# Patient Record
Sex: MALE | Race: WHITE | NOT HISPANIC OR LATINO | Employment: FULL TIME | URBAN - METROPOLITAN AREA
[De-identification: names, ages, dates, MRNs, and addresses within clinical notes are randomized per-mention and may not be internally consistent; named-entity substitution may affect disease eponyms.]

---

## 2019-06-04 RX ORDER — ALBUTEROL SULFATE 90 UG/1
AEROSOL, METERED RESPIRATORY (INHALATION)
Qty: 1 INHALER | Refills: 5 | Status: SHIPPED | OUTPATIENT
Start: 2019-06-04 | End: 2020-06-15 | Stop reason: SDUPTHER

## 2019-06-04 RX ORDER — ALBUTEROL SULFATE 90 UG/1
2 AEROSOL, METERED RESPIRATORY (INHALATION) EVERY 4 HOURS PRN
COMMUNITY
End: 2019-06-14 | Stop reason: SDUPTHER

## 2019-06-04 RX ORDER — ALBUTEROL SULFATE 90 UG/1
2 AEROSOL, METERED RESPIRATORY (INHALATION) EVERY 4 HOURS PRN
COMMUNITY
End: 2019-06-04 | Stop reason: SDUPTHER

## 2019-06-06 PROBLEM — J30.9 ALLERGIC RHINITIS: Status: ACTIVE | Noted: 2019-06-06

## 2019-06-06 PROBLEM — E66.9 OBESITY: Status: ACTIVE | Noted: 2019-06-06

## 2019-06-06 PROBLEM — F98.8 ATTENTION DEFICIT DISORDER OF CHILDHOOD: Status: ACTIVE | Noted: 2019-06-06

## 2019-06-06 PROBLEM — M10.9 GOUT, UNSPECIFIED: Status: ACTIVE | Noted: 2019-06-06

## 2019-06-06 PROBLEM — Z00.00 ANNUAL PHYSICAL EXAM: Status: ACTIVE | Noted: 2019-06-06

## 2019-06-06 PROBLEM — J45.901 EXACERBATION OF ASTHMA: Status: ACTIVE | Noted: 2019-06-06

## 2019-06-06 PROBLEM — F90.0 ATTENTION DEFICIT HYPERACTIVITY DISORDER, PREDOMINANTLY INATTENTIVE TYPE: Status: ACTIVE | Noted: 2019-06-06

## 2019-06-10 ENCOUNTER — OFFICE VISIT (OUTPATIENT)
Dept: INTERNAL MEDICINE | Facility: CLINIC | Age: 64
End: 2019-06-10

## 2019-06-10 VITALS
SYSTOLIC BLOOD PRESSURE: 132 MMHG | BODY MASS INDEX: 34.19 KG/M2 | HEART RATE: 68 BPM | WEIGHT: 275 LBS | DIASTOLIC BLOOD PRESSURE: 88 MMHG | HEIGHT: 75 IN | OXYGEN SATURATION: 97 %

## 2019-06-10 DIAGNOSIS — F98.8 ATTENTION DEFICIT DISORDER OF CHILDHOOD: Primary | ICD-10-CM

## 2019-06-10 DIAGNOSIS — E66.09 CLASS 1 OBESITY DUE TO EXCESS CALORIES WITHOUT SERIOUS COMORBIDITY WITH BODY MASS INDEX (BMI) OF 34.0 TO 34.9 IN ADULT: ICD-10-CM

## 2019-06-10 DIAGNOSIS — J30.1 SEASONAL ALLERGIC RHINITIS DUE TO POLLEN: ICD-10-CM

## 2019-06-10 DIAGNOSIS — J45.21 MILD INTERMITTENT ASTHMA WITH EXACERBATION: ICD-10-CM

## 2019-06-10 PROCEDURE — 99214 OFFICE O/P EST MOD 30 MIN: CPT | Performed by: INTERNAL MEDICINE

## 2019-06-10 RX ORDER — ZINC GLUCONATE 50 MG
1 TABLET ORAL EVERY OTHER DAY
COMMUNITY
Start: 2016-08-04

## 2019-06-10 RX ORDER — MAGNESIUM GLUCONATE 30 MG(550)
1 TABLET ORAL EVERY OTHER DAY
COMMUNITY

## 2019-06-10 RX ORDER — ASCORBIC ACID 500 MG
1000 TABLET ORAL DAILY
COMMUNITY
Start: 2016-03-08

## 2019-06-10 RX ORDER — BUDESONIDE AND FORMOTEROL FUMARATE DIHYDRATE 160; 4.5 UG/1; UG/1
2 AEROSOL RESPIRATORY (INHALATION)
Qty: 1 INHALER | Refills: 12 | COMMUNITY
Start: 2019-06-10 | End: 2019-09-10 | Stop reason: SDUPTHER

## 2019-06-10 RX ORDER — DEXTROAMPHETAMINE SACCHARATE, AMPHETAMINE ASPARTATE, DEXTROAMPHETAMINE SULFATE AND AMPHETAMINE SULFATE 2.5; 2.5; 2.5; 2.5 MG/1; MG/1; MG/1; MG/1
10 TABLET ORAL 2 TIMES DAILY
Qty: 60 TABLET | Refills: 0 | Status: SHIPPED | OUTPATIENT
Start: 2019-06-10 | End: 2019-09-10 | Stop reason: SDUPTHER

## 2019-06-10 NOTE — PROGRESS NOTES
HISTORY OF PRESENT ILLNESS     HPI     Rash  -has been going on for over a month  -rash is itchy  -states that it is is all over his body  -keeps getting new spots  -tried hydrocortisone cream; helps a little bit  -the spots never seem to go away  -no one else has the rash  -switched detergents about 1 month without improvement  -also notes rash on his scrotum     Gout  -allopurinol,  -uric acid 5.9 5/2018     Hyperlipidemia  -atorvastatin     HTN  -enalapril, hydralazine, metoprolol (takes hydralazine BID)  -amlodipine not tolerated d/t ankle edema  -no chest pain/sob  -no problems with medication    DMII  Hemoglobin A1C (%)   Date Value   05/08/2018 8.7 (H)   -metformin  -not checking blood sugars    PAST MEDICAL, FAMILY AND SOCIAL HISTORY     The following histories were personally reviewed and updated.  Current medications    REVIEW OF SYSTEMS     Review of Systems    PHYSICAL EXAM     Physical Exam   Constitutional: He is oriented to person, place, and time. He appears well-developed and well-nourished. No distress.   HENT:   Head: Normocephalic and atraumatic.   Right Ear: External ear normal.   Left Ear: External ear normal.   Eyes: Conjunctivae are normal.   Cardiovascular: Normal rate, regular rhythm and normal heart sounds.   Pulmonary/Chest: Effort normal and breath sounds normal.   Genitourinary: Right testis shows swelling (Right scrotum is significantly larger than the left; testicle is enlarged; no pain with palpation).   Neurological: He is alert and oriented to person, place, and time.   Skin: Skin is warm and dry. He is not diaphoretic.   Psychiatric: He has a normal mood and affect. His behavior is normal.     Scattered scaling 2-3 cm slightly erythematous circular lesions on arms, chest, legs. Scrotum with eczematous changes to inferior aspect   ASSESSMENT/PLAN     59 year old male here today in followup    DMII: Labs per orders    Hypertension: Blood pressure above goal. Pt is currently taking  [facility]     Cody Sanchez  1955   [id number]      Patient Care Team:  Ridge Hunter MD as PCP - General  Ridge Hunter MD as PCP - Family Medicine    Chief Complaint::   Chief Complaint   Patient presents with   • Asthma   • Shortness of Breath     states he can't get a deep breath        HPI  Mr. Sanchez is now 63.  He comes in for follow-up of his allergies, asthma, obesity and attention deficit disorder.  He has been off Adderall, and recently, as work has been busier, has noticed that he had difficulty with focus, attention, multitasking.  Like to get back on Adderall.  His asthma and allergies are worse.  He is using albuterol inhaler and fluticasone nasal once a day.  Despite that he still has wheezing.  He knows he needs to lose weight.  He states he is started walking at lunch.    Chronic Conditions:      Patient Active Problem List   Diagnosis   • Annual physical exam   • Gout, unspecified   • Attention deficit hyperactivity disorder, predominantly inattentive type   • Exacerbation of asthma   • Attention deficit disorder of childhood   • Obesity   • Allergic rhinitis        Past Medical History:   Diagnosis Date   • Deep vein thrombosis (CMS/HCC)        Past Surgical History:   Procedure Laterality Date   • TONSILLECTOMY         Family History   Problem Relation Age of Onset   • Hypertension Mother    • Coronary artery disease Mother    • Hypertension Father    • Coronary artery disease Father    • Cancer Brother         PROSTATE CANCER        Social History     Socioeconomic History   • Marital status:      Spouse name: Not on file   • Number of children: Not on file   • Years of education: Not on file   • Highest education level: Not on file   Tobacco Use   • Smoking status: Never Smoker   • Smokeless tobacco: Never Used   Substance and Sexual Activity   • Alcohol use: No   • Drug use: No       No Known Allergies      Current Outpatient Medications:   •   albuterol sulfate  (90 Base) MCG/ACT inhaler, Inhale 2 puffs by inhalation route every 4-6 hours as needed, Disp: 1 inhaler, Rfl: 5  •  aspirin 81 MG chewable tablet, Chew 81 mg Daily., Disp: , Rfl:   •  B Complex Vitamins (VITAMIN-B COMPLEX PO), Take 1 tablet by mouth Daily., Disp: , Rfl:   •  BIOTIN PO, Take 1 tablet by mouth Every Other Day., Disp: , Rfl:   •  Cholecalciferol (VITAMIN D3) 1000 units capsule, Take 1 capsule by mouth Every Other Day., Disp: , Rfl:   •  Magnesium Gluconate 550 MG tablet, Take 1 tablet by mouth Every Other Day., Disp: , Rfl:   •  MULTIPLE VITAMIN PO, Take 1 tablet by mouth Daily., Disp: , Rfl:   •  vitamin C (ASCORBIC ACID) 500 MG tablet, Take 1 tablet by mouth Daily., Disp: , Rfl:   •  VITAMIN E COMPLEX PO, Take 1 tablet by mouth Every Other Day., Disp: , Rfl:   •  Zinc 50 MG tablet, Take 1 tablet by mouth Every Other Day., Disp: , Rfl:   •  albuterol sulfate  (90 Base) MCG/ACT inhaler, Inhale 2 puffs Every 4 (Four) Hours As Needed., Disp: , Rfl:   •  amphetamine-dextroamphetamine (ADDERALL) 10 MG tablet, Take 1 tablet by mouth 2 (Two) Times a Day., Disp: 60 tablet, Rfl: 0  •  budesonide-formoterol (SYMBICORT) 160-4.5 MCG/ACT inhaler, Inhale 2 puffs 2 (Two) Times a Day., Disp: 1 inhaler, Rfl: 12    Review of Systems   Constitutional: Positive for fatigue. Negative for chills and fever.   HENT: Positive for congestion. Negative for ear pain and sinus pressure.    Respiratory: Positive for cough, chest tightness, shortness of breath and wheezing.    Cardiovascular: Negative for chest pain and palpitations.   Gastrointestinal: Negative for abdominal pain, blood in stool and constipation.   Skin: Negative for color change.   Allergic/Immunologic: Negative for environmental allergies.   Neurological: Negative for dizziness, speech difficulty and headache.   Psychiatric/Behavioral: Negative for decreased concentration. The patient is not nervous/anxious.         Vital  his medications. Have struggled with compliance in terms of followup for this. At this time will continue current medications but will have him schedule nurse visit in 2 weeks for repeat bp check. If still high then would consider increase hydralazine to 100 mg BID.     Rash: Will treat with topical triamcinolone. If no improvement then will refer to derm.    Right testicle enlargement/mass: Pt states that he was told during his colonoscopy in 2010 that his testes was abnormal. He states that it has not changed since then. No pain. Advised him of the need to US ASAP. Pt is going on vacation but states that he will get it done as soon as possible.      "Signs  Vitals:    06/10/19 1553   BP: 132/88   BP Location: Right arm   Patient Position: Sitting   Cuff Size: Adult   Pulse: 68   SpO2: 97%   Weight: 125 kg (275 lb)   Height: 190 cm (74.8\")       Physical Exam   Constitutional: He is oriented to person, place, and time. He appears well-developed and well-nourished.   HENT:   Head: Normocephalic and atraumatic.   Cardiovascular: Normal rate, regular rhythm and normal heart sounds.   No murmur heard.  Pulmonary/Chest: Effort normal. He has wheezes in the right upper field, the right middle field, the left upper field and the left middle field.   Neurological: He is alert and oriented to person, place, and time.   Psychiatric: He has a normal mood and affect.   Vitals reviewed.     Procedures    ACE III MINI             Assessment/Plan:    Cody was seen today for asthma and shortness of breath.    Diagnoses and all orders for this visit:    Attention deficit disorder of childhood    Seasonal allergic rhinitis due to pollen    Mild intermittent asthma with exacerbation    Class 1 obesity due to excess calories without serious comorbidity with body mass index (BMI) of 34.0 to 34.9 in adult    Other orders  -     amphetamine-dextroamphetamine (ADDERALL) 10 MG tablet; Take 1 tablet by mouth 2 (Two) Times a Day.  -     budesonide-formoterol (SYMBICORT) 160-4.5 MCG/ACT inhaler; Inhale 2 puffs 2 (Two) Times a Day.    Plan    Resume Adderall.  He responded well previously    I recommended that he resume over-the-counter treatment for allergies including antihistamines and nasal steroids.    He is given Symbicort inhaler for maintenance therapy and as a controller.  He may continue using albuterol as needed.    BMI is 34.  We discussed strategies to lose weight by reducing calories and improving fitness.      Plan of care reviewed with patient at the conclusion of today's visit. Education was provided regarding diagnosis, management, and any prescribed or recommended OTC " medications.Patient verbalizes understanding of and agreement with management plan.         Ridge Hunter MD

## 2019-09-10 ENCOUNTER — OFFICE VISIT (OUTPATIENT)
Dept: INTERNAL MEDICINE | Facility: CLINIC | Age: 64
End: 2019-09-10

## 2019-09-10 VITALS
SYSTOLIC BLOOD PRESSURE: 116 MMHG | WEIGHT: 263 LBS | TEMPERATURE: 97.6 F | HEIGHT: 75 IN | BODY MASS INDEX: 32.7 KG/M2 | DIASTOLIC BLOOD PRESSURE: 70 MMHG | HEART RATE: 65 BPM

## 2019-09-10 DIAGNOSIS — J45.20 MILD INTERMITTENT ASTHMA WITHOUT COMPLICATION: ICD-10-CM

## 2019-09-10 DIAGNOSIS — J30.1 SEASONAL ALLERGIC RHINITIS DUE TO POLLEN: ICD-10-CM

## 2019-09-10 DIAGNOSIS — E66.09 CLASS 1 OBESITY DUE TO EXCESS CALORIES WITHOUT SERIOUS COMORBIDITY WITH BODY MASS INDEX (BMI) OF 33.0 TO 33.9 IN ADULT: ICD-10-CM

## 2019-09-10 DIAGNOSIS — F90.0 ATTENTION DEFICIT HYPERACTIVITY DISORDER, PREDOMINANTLY INATTENTIVE TYPE: Primary | ICD-10-CM

## 2019-09-10 PROCEDURE — 99214 OFFICE O/P EST MOD 30 MIN: CPT | Performed by: INTERNAL MEDICINE

## 2019-09-10 RX ORDER — BUDESONIDE AND FORMOTEROL FUMARATE DIHYDRATE 160; 4.5 UG/1; UG/1
2 AEROSOL RESPIRATORY (INHALATION)
Qty: 1 INHALER | Refills: 12 | COMMUNITY
Start: 2019-09-10 | End: 2019-10-28 | Stop reason: SDUPTHER

## 2019-09-10 RX ORDER — DEXTROAMPHETAMINE SACCHARATE, AMPHETAMINE ASPARTATE, DEXTROAMPHETAMINE SULFATE AND AMPHETAMINE SULFATE 2.5; 2.5; 2.5; 2.5 MG/1; MG/1; MG/1; MG/1
10 TABLET ORAL 2 TIMES DAILY
Qty: 60 TABLET | Refills: 0 | Status: SHIPPED | OUTPATIENT
Start: 2019-09-10 | End: 2019-10-22 | Stop reason: SDUPTHER

## 2019-09-10 NOTE — PROGRESS NOTES
[facility]     Cody Sanchez  1955   [id number]      Patient Care Team:  Ridge Hunter MD as PCP - General  Ridge Hunter MD as PCP - Family Medicine    Chief Complaint::   Chief Complaint   Patient presents with   • Asthma     fasting; 3 month follow up         HPI  Mr. Sanchez comes in for follow-up of his ADD, asthma, allergic rhinitis and obesity.  He has gradually resumed Adderall, starting with 1/2 tablet once or twice a day when at work and now is back up on 10 mg once or twice a day.  This has helped his attention and focus and he can get his work done more efficiently.  His asthma is a bit flared this morning because he did vacuuming at home before he came to the office, which stirred up some dust.  After our discussion about weight loss last visit, he has implemented intermittent fasting.  He has lost 12 pounds and says this works for him.    Chronic Conditions:      Patient Active Problem List   Diagnosis   • Annual physical exam   • Gout, unspecified   • Attention deficit hyperactivity disorder, predominantly inattentive type   • Exacerbation of asthma   • Attention deficit disorder of childhood   • Obesity   • Allergic rhinitis        Past Medical History:   Diagnosis Date   • Deep vein thrombosis (CMS/HCC)        Past Surgical History:   Procedure Laterality Date   • TONSILLECTOMY         Family History   Problem Relation Age of Onset   • Hypertension Mother    • Coronary artery disease Mother    • Hypertension Father    • Coronary artery disease Father    • Cancer Brother         PROSTATE CANCER        Social History     Socioeconomic History   • Marital status:      Spouse name: Not on file   • Number of children: Not on file   • Years of education: Not on file   • Highest education level: Not on file   Tobacco Use   • Smoking status: Never Smoker   • Smokeless tobacco: Never Used   Substance and Sexual Activity   • Alcohol use: No   • Drug use: No        No Known Allergies      Current Outpatient Medications:   •  albuterol sulfate  (90 Base) MCG/ACT inhaler, Inhale 2 puffs by inhalation route every 4-6 hours as needed, Disp: 1 inhaler, Rfl: 5  •  amphetamine-dextroamphetamine (ADDERALL) 10 MG tablet, Take 1 tablet by mouth 2 (Two) Times a Day., Disp: 60 tablet, Rfl: 0  •  aspirin 81 MG chewable tablet, Chew 81 mg Daily., Disp: , Rfl:   •  B Complex Vitamins (VITAMIN-B COMPLEX PO), Take 1 tablet by mouth Daily., Disp: , Rfl:   •  BIOTIN PO, Take 1 tablet by mouth Every Other Day., Disp: , Rfl:   •  Cholecalciferol (VITAMIN D3) 1000 units capsule, Take 1 capsule by mouth Every Other Day., Disp: , Rfl:   •  Magnesium Gluconate 550 MG tablet, Take 1 tablet by mouth Every Other Day., Disp: , Rfl:   •  MULTIPLE VITAMIN PO, Take 1 tablet by mouth Daily., Disp: , Rfl:   •  vitamin C (ASCORBIC ACID) 500 MG tablet, Take 1 tablet by mouth Daily., Disp: , Rfl:   •  VITAMIN E COMPLEX PO, Take 1 tablet by mouth Every Other Day., Disp: , Rfl:   •  Zinc 50 MG tablet, Take 1 tablet by mouth Every Other Day., Disp: , Rfl:   •  budesonide-formoterol (SYMBICORT) 160-4.5 MCG/ACT inhaler, Inhale 2 puffs 2 (Two) Times a Day., Disp: 1 inhaler, Rfl: 12    Review of Systems   Constitutional: Negative for chills, fatigue and fever.   HENT: Negative for congestion, ear pain and sinus pressure.    Respiratory: Positive for cough, chest tightness, shortness of breath and wheezing.    Cardiovascular: Negative for chest pain and palpitations.   Gastrointestinal: Negative for abdominal pain, blood in stool and constipation.   Skin: Negative for color change.   Allergic/Immunologic: Negative for environmental allergies.   Neurological: Negative for dizziness, speech difficulty and headache.   Psychiatric/Behavioral: Negative for decreased concentration. The patient is not nervous/anxious.         Vital Signs  Vitals:    09/10/19 0919   BP: 116/70   BP Location: Left arm   Patient  "Position: Sitting   Cuff Size: Adult   Pulse: 65   Temp: 97.6 °F (36.4 °C)   TempSrc: Temporal   Weight: 119 kg (263 lb)   Height: 190 cm (74.8\")   PainSc: 0-No pain       Physical Exam   Constitutional: He is oriented to person, place, and time. He appears well-developed and well-nourished. He is obese.  HENT:   Head: Normocephalic and atraumatic.   Cardiovascular: Normal rate, regular rhythm and normal heart sounds.   No murmur heard.  Pulmonary/Chest: Effort normal and breath sounds normal.   Diffuse wheezing on forced expiration only   Neurological: He is alert and oriented to person, place, and time.   Psychiatric: He has a normal mood and affect.   Vitals reviewed.     Procedures    ACE III MINI             Assessment/Plan:    Cody was seen today for asthma.    Diagnoses and all orders for this visit:    Attention deficit hyperactivity disorder, predominantly inattentive type    Class 1 obesity due to excess calories without serious comorbidity with body mass index (BMI) of 33.0 to 33.9 in adult    Mild intermittent asthma without complication    Seasonal allergic rhinitis due to pollen    Other orders  -     amphetamine-dextroamphetamine (ADDERALL) 10 MG tablet; Take 1 tablet by mouth 2 (Two) Times a Day.  -     budesonide-formoterol (SYMBICORT) 160-4.5 MCG/ACT inhaler; Inhale 2 puffs 2 (Two) Times a Day.    Plan    Good response to Adderall, he will continue taking it once or twice a day on workdays.    He will continue Symbicort twice a day for maintenance albuterol as needed for his asthma.    He will continue over-the-counter antihistamines and nasal steroids as needed.    BMI today is 33.  He has had a good response to intermittent fasting with 12 pounds weight loss.  He is working on increasing his physical activity.    I recommended that because he is taking aspirin for primary prevention, that he should discontinue.  Plan of care reviewed with patient at the conclusion of today's visit. Education " was provided regarding diagnosis, management, and any prescribed or recommended OTC medications.Patient verbalizes understanding of and agreement with management plan.         Ridge Hunter MD

## 2019-10-21 ENCOUNTER — TELEPHONE (OUTPATIENT)
Dept: INTERNAL MEDICINE | Facility: CLINIC | Age: 64
End: 2019-10-21

## 2019-10-21 NOTE — TELEPHONE ENCOUNTER
Pt called for refills on his inhalers  I informed him that Albuterol and Symbicort should have refills   He is going to call pharmacy and if not call me back

## 2019-10-22 ENCOUNTER — TELEPHONE (OUTPATIENT)
Dept: INTERNAL MEDICINE | Facility: CLINIC | Age: 64
End: 2019-10-22

## 2019-10-22 RX ORDER — BUDESONIDE AND FORMOTEROL FUMARATE DIHYDRATE 160; 4.5 UG/1; UG/1
2 AEROSOL RESPIRATORY (INHALATION)
Qty: 1 INHALER | Refills: 12 | Status: CANCELLED | COMMUNITY
Start: 2019-10-22

## 2019-10-22 NOTE — TELEPHONE ENCOUNTER
[facility]OSUE SAID THEY NEED NEW PRESCRIPTIONS.    90 DAY SUPPLY IF POSSIBLE.    PLEASE CALL AND ADVISE PATIENT WHEN THE MEDICATIONS ARE CALLED IN.

## 2019-10-23 RX ORDER — DEXTROAMPHETAMINE SACCHARATE, AMPHETAMINE ASPARTATE, DEXTROAMPHETAMINE SULFATE AND AMPHETAMINE SULFATE 2.5; 2.5; 2.5; 2.5 MG/1; MG/1; MG/1; MG/1
10 TABLET ORAL 2 TIMES DAILY
Qty: 60 TABLET | Refills: 0 | Status: SHIPPED | OUTPATIENT
Start: 2019-10-23 | End: 2019-12-09 | Stop reason: SDUPTHER

## 2019-10-28 ENCOUNTER — TELEPHONE (OUTPATIENT)
Dept: INTERNAL MEDICINE | Facility: CLINIC | Age: 64
End: 2019-10-28

## 2019-10-28 RX ORDER — BUDESONIDE AND FORMOTEROL FUMARATE DIHYDRATE 160; 4.5 UG/1; UG/1
2 AEROSOL RESPIRATORY (INHALATION)
Qty: 1 INHALER | Refills: 12 | COMMUNITY
Start: 2019-10-28 | End: 2020-06-09 | Stop reason: SDUPTHER

## 2019-10-28 NOTE — TELEPHONE ENCOUNTER
Patient called to follow up on refill request. Last he checked [facility] has still not received prescription. When I look at the Meds tab I see the refill being ordered and signed, but there is not an E-scribe confirmation receipt from the pharmacy and the class is listed as Sample. Patient would like to confirm that everything was entered correctly on our end for refill. Please advise.

## 2019-10-28 NOTE — TELEPHONE ENCOUNTER
PHARM CALLED IN REGARDS TO THE PATIENTS SYMBICORT 160-4.5 THAT THEY STATED THE PATIENT SAID SHOULD HAVE BEEN CALLED INTO THERE PHARMACY AND WOULD LIKE TO GET A CALL BACK IN REGARDS TO THIS MATTER AS THEY STATED THEY'VE NEVER FILLED THIS SCRIPT FOR THE PATIENT IN THE PASS. THE PHARM CAN BE REACHED [phone]

## 2019-10-28 NOTE — TELEPHONE ENCOUNTER
Notified pharmacy that it was sent in September with 12 refills. They stated they did not get it and I resent.

## 2019-12-09 ENCOUNTER — OFFICE VISIT (OUTPATIENT)
Dept: INTERNAL MEDICINE | Facility: CLINIC | Age: 64
End: 2019-12-09

## 2019-12-09 VITALS
SYSTOLIC BLOOD PRESSURE: 148 MMHG | BODY MASS INDEX: 34.19 KG/M2 | HEART RATE: 88 BPM | WEIGHT: 258 LBS | HEIGHT: 73 IN | DIASTOLIC BLOOD PRESSURE: 92 MMHG

## 2019-12-09 DIAGNOSIS — Z00.00 ANNUAL PHYSICAL EXAM: Primary | ICD-10-CM

## 2019-12-09 DIAGNOSIS — Z12.5 PROSTATE CANCER SCREENING: ICD-10-CM

## 2019-12-09 DIAGNOSIS — R73.9 HYPERGLYCEMIA: ICD-10-CM

## 2019-12-09 DIAGNOSIS — M1A.00X0 IDIOPATHIC CHRONIC GOUT WITHOUT TOPHUS, UNSPECIFIED SITE: ICD-10-CM

## 2019-12-09 DIAGNOSIS — J30.1 SEASONAL ALLERGIC RHINITIS DUE TO POLLEN: ICD-10-CM

## 2019-12-09 DIAGNOSIS — E78.2 MIXED HYPERLIPIDEMIA: ICD-10-CM

## 2019-12-09 DIAGNOSIS — E66.09 CLASS 1 OBESITY DUE TO EXCESS CALORIES WITHOUT SERIOUS COMORBIDITY WITH BODY MASS INDEX (BMI) OF 33.0 TO 33.9 IN ADULT: ICD-10-CM

## 2019-12-09 DIAGNOSIS — Z12.11 COLON CANCER SCREENING: ICD-10-CM

## 2019-12-09 DIAGNOSIS — Z13.0 SCREENING FOR IRON DEFICIENCY ANEMIA: ICD-10-CM

## 2019-12-09 DIAGNOSIS — F90.0 ATTENTION DEFICIT HYPERACTIVITY DISORDER, PREDOMINANTLY INATTENTIVE TYPE: ICD-10-CM

## 2019-12-09 PROCEDURE — 99396 PREV VISIT EST AGE 40-64: CPT | Performed by: INTERNAL MEDICINE

## 2019-12-09 RX ORDER — DEXTROAMPHETAMINE SACCHARATE, AMPHETAMINE ASPARTATE, DEXTROAMPHETAMINE SULFATE AND AMPHETAMINE SULFATE 2.5; 2.5; 2.5; 2.5 MG/1; MG/1; MG/1; MG/1
10 TABLET ORAL 2 TIMES DAILY
Qty: 60 TABLET | Refills: 0 | Status: SHIPPED | OUTPATIENT
Start: 2019-12-09 | End: 2020-03-13 | Stop reason: SDUPTHER

## 2019-12-09 NOTE — PROGRESS NOTES
[facility]     Cody Sanchez  1955   [id number]      Patient Care Team:  Ridge Hunter MD as PCP - General  Ridge Hunter MD as PCP - Family Medicine    Chief Complaint::   Chief Complaint   Patient presents with   • Annual Exam   • ADHD   • Asthma        HPI  Mr. Sanchez is now 64.  He comes in for follow-up of his ADHD, obesity, hyperglycemia, hyperlipidemia, gout, obesity and allergic rhinitis.  Overall he feels well but continues to struggle with weight.  He feels well emotionally and continues to do well as a .  There is no chest pain or dyspnea.  He also takes pictures at sporting events.    Chronic Conditions:      Patient Active Problem List   Diagnosis   • Annual physical exam   • Gout, unspecified   • Attention deficit hyperactivity disorder, predominantly inattentive type   • Exacerbation of asthma   • Obesity   • Allergic rhinitis        Past Medical History:   Diagnosis Date   • Deep vein thrombosis (CMS/HCC)        Past Surgical History:   Procedure Laterality Date   • TONSILLECTOMY         Family History   Problem Relation Age of Onset   • Hypertension Mother    • Coronary artery disease Mother    • Hypertension Father    • Coronary artery disease Father    • Cancer Brother         PROSTATE CANCER        Social History     Socioeconomic History   • Marital status:      Spouse name: Not on file   • Number of children: Not on file   • Years of education: Not on file   • Highest education level: Not on file   Tobacco Use   • Smoking status: Never Smoker   • Smokeless tobacco: Never Used   Substance and Sexual Activity   • Alcohol use: No   • Drug use: No       No Known Allergies      Current Outpatient Medications:   •  albuterol sulfate  (90 Base) MCG/ACT inhaler, Inhale 2 puffs by inhalation route every 4-6 hours as needed, Disp: 1 inhaler, Rfl: 5  •  amphetamine-dextroamphetamine (ADDERALL) 10 MG tablet, Take 1 tablet by mouth 2 (Two)  Times a Day., Disp: 60 tablet, Rfl: 0  •  B Complex Vitamins (VITAMIN-B COMPLEX PO), Take 1 tablet by mouth Daily., Disp: , Rfl:   •  BIOTIN PO, Take 1 tablet by mouth Every Other Day., Disp: , Rfl:   •  budesonide-formoterol (SYMBICORT) 160-4.5 MCG/ACT inhaler, Inhale 2 puffs 2 (Two) Times a Day., Disp: 1 inhaler, Rfl: 12  •  Cholecalciferol (VITAMIN D3) 1000 units capsule, Take 1 capsule by mouth Every Other Day., Disp: , Rfl:   •  Magnesium Gluconate 550 MG tablet, Take 1 tablet by mouth Every Other Day., Disp: , Rfl:   •  MULTIPLE VITAMIN PO, Take 1 tablet by mouth Daily., Disp: , Rfl:   •  VITAMIN A PO, Take 1 tablet by mouth Daily., Disp: , Rfl:   •  vitamin C (ASCORBIC ACID) 500 MG tablet, Take 1 tablet by mouth Daily., Disp: , Rfl:   •  VITAMIN E COMPLEX PO, Take 1 tablet by mouth Every Other Day., Disp: , Rfl:   •  Zinc 50 MG tablet, Take 1 tablet by mouth Every Other Day., Disp: , Rfl:     Immunization History   Administered Date(s) Administered   • Tdap 04/10/2009        Health Maintenance Due   Topic Date Due   • ZOSTER VACCINE (1 of 2) 10/25/2005   • ANNUAL PHYSICAL  01/16/2019   • TDAP/TD VACCINES (2 - Td) 04/10/2019   • HEPATITIS C SCREENING  06/04/2019   • COLONOSCOPY  06/04/2019   • INFLUENZA VACCINE  08/01/2019        Review of Systems   Constitutional: Negative for chills, fatigue and fever.   HENT: Negative for congestion, ear pain and sinus pressure.    Respiratory: Negative for cough, chest tightness, shortness of breath and wheezing.    Cardiovascular: Negative for chest pain and palpitations.   Gastrointestinal: Negative for abdominal pain, blood in stool and constipation.   Skin: Negative for color change.   Allergic/Immunologic: Negative for environmental allergies.   Neurological: Negative for dizziness, speech difficulty and headache.   Psychiatric/Behavioral: Negative for decreased concentration. The patient is not nervous/anxious.         Vital Signs  Vitals:    12/09/19 1628   BP:  "148/92   BP Location: Left arm   Patient Position: Sitting   Cuff Size: Adult   Pulse: 88   Weight: 117 kg (258 lb)   Height: 184.2 cm (72.5\")   PainSc: 0-No pain       Physical Exam   Constitutional: He is oriented to person, place, and time. He appears well-developed and well-nourished.   HENT:   Head: Normocephalic and atraumatic.   Right Ear: External ear normal.   Left Ear: External ear normal.   Nose: Nose normal.   Mouth/Throat: Oropharynx is clear and moist. No oropharyngeal exudate.   Eyes: Pupils are equal, round, and reactive to light. Conjunctivae and EOM are normal.   Neck: Normal range of motion. Neck supple. No JVD present. No thyromegaly present.   Cardiovascular: Normal rate, regular rhythm, normal heart sounds and intact distal pulses. Exam reveals no gallop and no friction rub.   No murmur heard.  Pulmonary/Chest: Effort normal and breath sounds normal. No respiratory distress. He has no wheezes. He has no rales. He exhibits no tenderness.   Abdominal: Soft. Bowel sounds are normal. He exhibits no distension and no mass. There is no tenderness. There is no rebound and no guarding. No hernia.   Musculoskeletal: Normal range of motion. He exhibits no tenderness.   Lymphadenopathy:     He has no cervical adenopathy.   Neurological: He is alert and oriented to person, place, and time. He displays normal reflexes. No cranial nerve deficit or sensory deficit. He exhibits normal muscle tone. Coordination normal.   Skin: Skin is warm and dry. No rash noted. No erythema.   Psychiatric: He has a normal mood and affect. His behavior is normal. Judgment and thought content normal.   Nursing note and vitals reviewed.     Procedures     Fall Risk Screen:  STEADI Fall Risk Assessment has not been completed.    Health Habits and Functional and Cognitive Screening:  No flowsheet data found.    Depression Sreening  PHQ-9 Total Score: 0     ACE III MINI             Assessment/Plan:    Cody was seen today for " annual exam, adhd and asthma.    Diagnoses and all orders for this visit:    Annual physical exam    Hyperglycemia  -     Hemoglobin A1c; Future  -     Hemoglobin A1c    Prostate cancer screening  -     PSA Screen; Future  -     PSA Screen    Mixed hyperlipidemia  -     Comprehensive Metabolic Panel; Future  -     Lipid Panel; Future  -     Lipid Panel  -     Comprehensive Metabolic Panel    Idiopathic chronic gout without tophus, unspecified site    Screening for iron deficiency anemia  -     CBC & Differential; Future  -     CBC & Differential    Attention deficit hyperactivity disorder, predominantly inattentive type  -     amphetamine-dextroamphetamine (ADDERALL) 10 MG tablet; Take 1 tablet by mouth 2 (Two) Times a Day.    Colon cancer screening  -     Ambulatory Referral to Colorectal Surgery    Class 1 obesity due to excess calories without serious comorbidity with body mass index (BMI) of 33.0 to 33.9 in adult    Seasonal allergic rhinitis due to pollen    Plan    Overall he remains in good health but needs to lose weight.  This is discussed below.  He is due for colorectal cancer screening and is referred to colonoscopy.    A1c is pending, treatment remains avoidance of weight gain and low-carb diet.    Lipid panel is pending, the treatment remains healthy diet and avoidance of weight gain.    Gout is currently asymptomatic, he will continue treatment as needed with NSAIDs.    He continues to have a good response to Adderall with good focus and attention.    BMI is 34.5, once again we discussed the importance of low-carb diet and regular exercise to lose weight.    Allergies and asthma are well controlled on Symbicort daily, albuterol as needed, and allergy treatment.          Labs  Results for orders placed or performed in visit on 12/09/19   Hemoglobin A1c   Result Value Ref Range    Hemoglobin A1C 6.00 (H) 4.80 - 5.60 %   PSA Screen   Result Value Ref Range    PSA 0.273 0.000 - 4.000 ng/mL   Lipid Panel    Result Value Ref Range    Total Cholesterol 196 0 - 200 mg/dL    Triglycerides 107 0 - 150 mg/dL    HDL Cholesterol 36 (L) 40 - 60 mg/dL    VLDL Cholesterol 21.4 mg/dL    LDL Cholesterol  139 (H) 0 - 100 mg/dL   Comprehensive Metabolic Panel   Result Value Ref Range    Glucose 105 (H) 65 - 99 mg/dL    BUN 13 8 - 23 mg/dL    Creatinine 0.86 0.76 - 1.27 mg/dL    eGFR Non African Am 90 >60 mL/min/1.73    eGFR African Am 109 >60 mL/min/1.73    BUN/Creatinine Ratio 15.1 7.0 - 25.0    Sodium 142 136 - 145 mmol/L    Potassium 4.9 3.5 - 5.2 mmol/L    Chloride 103 98 - 107 mmol/L    Total CO2 26.8 22.0 - 29.0 mmol/L    Calcium 9.3 8.6 - 10.5 mg/dL    Total Protein 6.9 6.0 - 8.5 g/dL    Albumin 4.60 3.50 - 5.20 g/dL    Globulin 2.3 gm/dL    A/G Ratio 2.0 g/dL    Total Bilirubin 0.4 0.2 - 1.2 mg/dL    Alkaline Phosphatase 81 39 - 117 U/L    AST (SGOT) 22 1 - 40 U/L    ALT (SGPT) 17 1 - 41 U/L   CBC & Differential   Result Value Ref Range    WBC 6.81 3.40 - 10.80 10*3/mm3    RBC 4.73 4.14 - 5.80 10*6/mm3    Hemoglobin 13.9 13.0 - 17.7 g/dL    Hematocrit 41.2 37.5 - 51.0 %    MCV 87.1 79.0 - 97.0 fL    MCH 29.4 26.6 - 33.0 pg    MCHC 33.7 31.5 - 35.7 g/dL    RDW 12.4 12.3 - 15.4 %    Platelets 311 140 - 450 10*3/mm3    Neutrophil Rel % 59.0 42.7 - 76.0 %    Lymphocyte Rel % 22.9 19.6 - 45.3 %    Monocyte Rel % 9.7 5.0 - 12.0 %    Eosinophil Rel % 6.8 (H) 0.3 - 6.2 %    Basophil Rel % 1.3 0.0 - 1.5 %    Neutrophils Absolute 4.02 1.70 - 7.00 10*3/mm3    Lymphocytes Absolute 1.56 0.70 - 3.10 10*3/mm3    Monocytes Absolute 0.66 0.10 - 0.90 10*3/mm3    Eosinophils Absolute 0.46 (H) 0.00 - 0.40 10*3/mm3    Basophils Absolute 0.09 0.00 - 0.20 10*3/mm3    Immature Granulocyte Rel % 0.3 0.0 - 0.5 %    Immature Grans Absolute 0.02 0.00 - 0.05 10*3/mm3    nRBC 0.0 0.0 - 0.2 /100 WBC        Counseling was given to patient for the following topics: appropriate exercise 150 minutes/week, disease prevention and healthy eating habits.    Plan  of care reviewed with patient at the conclusion of today's visit. Education was provided regarding diagnosis, management, and any prescribed or recommended OTC medications.Patient verbalizes understanding of and agreement with management plan.         Ridge Hunter MD

## 2019-12-11 ENCOUNTER — LAB REQUISITION (OUTPATIENT)
Dept: LAB | Facility: HOSPITAL | Age: 64
End: 2019-12-11

## 2019-12-11 DIAGNOSIS — Z00.00 ROUTINE GENERAL MEDICAL EXAMINATION AT A HEALTH CARE FACILITY: ICD-10-CM

## 2019-12-11 PROCEDURE — 36415 COLL VENOUS BLD VENIPUNCTURE: CPT

## 2019-12-12 LAB
ALBUMIN SERPL-MCNC: 4.6 G/DL (ref 3.5–5.2)
ALBUMIN/GLOB SERPL: 2 G/DL
ALP SERPL-CCNC: 81 U/L (ref 39–117)
ALT SERPL-CCNC: 17 U/L (ref 1–41)
AST SERPL-CCNC: 22 U/L (ref 1–40)
BASOPHILS # BLD AUTO: 0.09 10*3/MM3 (ref 0–0.2)
BASOPHILS NFR BLD AUTO: 1.3 % (ref 0–1.5)
BILIRUB SERPL-MCNC: 0.4 MG/DL (ref 0.2–1.2)
BUN SERPL-MCNC: 13 MG/DL (ref 8–23)
BUN/CREAT SERPL: 15.1 (ref 7–25)
CALCIUM SERPL-MCNC: 9.3 MG/DL (ref 8.6–10.5)
CHLORIDE SERPL-SCNC: 103 MMOL/L (ref 98–107)
CHOLEST SERPL-MCNC: 196 MG/DL (ref 0–200)
CO2 SERPL-SCNC: 26.8 MMOL/L (ref 22–29)
CREAT SERPL-MCNC: 0.86 MG/DL (ref 0.76–1.27)
EOSINOPHIL # BLD AUTO: 0.46 10*3/MM3 (ref 0–0.4)
EOSINOPHIL NFR BLD AUTO: 6.8 % (ref 0.3–6.2)
ERYTHROCYTE [DISTWIDTH] IN BLOOD BY AUTOMATED COUNT: 12.4 % (ref 12.3–15.4)
GLOBULIN SER CALC-MCNC: 2.3 GM/DL
GLUCOSE SERPL-MCNC: 105 MG/DL (ref 65–99)
HBA1C MFR BLD: 6 % (ref 4.8–5.6)
HCT VFR BLD AUTO: 41.2 % (ref 37.5–51)
HDLC SERPL-MCNC: 36 MG/DL (ref 40–60)
HGB BLD-MCNC: 13.9 G/DL (ref 13–17.7)
IMM GRANULOCYTES # BLD AUTO: 0.02 10*3/MM3 (ref 0–0.05)
IMM GRANULOCYTES NFR BLD AUTO: 0.3 % (ref 0–0.5)
LDLC SERPL CALC-MCNC: 139 MG/DL (ref 0–100)
LYMPHOCYTES # BLD AUTO: 1.56 10*3/MM3 (ref 0.7–3.1)
LYMPHOCYTES NFR BLD AUTO: 22.9 % (ref 19.6–45.3)
MCH RBC QN AUTO: 29.4 PG (ref 26.6–33)
MCHC RBC AUTO-ENTMCNC: 33.7 G/DL (ref 31.5–35.7)
MCV RBC AUTO: 87.1 FL (ref 79–97)
MONOCYTES # BLD AUTO: 0.66 10*3/MM3 (ref 0.1–0.9)
MONOCYTES NFR BLD AUTO: 9.7 % (ref 5–12)
NEUTROPHILS # BLD AUTO: 4.02 10*3/MM3 (ref 1.7–7)
NEUTROPHILS NFR BLD AUTO: 59 % (ref 42.7–76)
NRBC BLD AUTO-RTO: 0 /100 WBC (ref 0–0.2)
PLATELET # BLD AUTO: 311 10*3/MM3 (ref 140–450)
POTASSIUM SERPL-SCNC: 4.9 MMOL/L (ref 3.5–5.2)
PROT SERPL-MCNC: 6.9 G/DL (ref 6–8.5)
PSA SERPL-MCNC: 0.27 NG/ML (ref 0–4)
RBC # BLD AUTO: 4.73 10*6/MM3 (ref 4.14–5.8)
SODIUM SERPL-SCNC: 142 MMOL/L (ref 136–145)
TRIGL SERPL-MCNC: 107 MG/DL (ref 0–150)
VLDLC SERPL CALC-MCNC: 21.4 MG/DL
WBC # BLD AUTO: 6.81 10*3/MM3 (ref 3.4–10.8)

## 2020-03-13 ENCOUNTER — OFFICE VISIT (OUTPATIENT)
Dept: INTERNAL MEDICINE | Facility: CLINIC | Age: 65
End: 2020-03-13

## 2020-03-13 ENCOUNTER — LAB REQUISITION (OUTPATIENT)
Dept: LAB | Facility: HOSPITAL | Age: 65
End: 2020-03-13

## 2020-03-13 ENCOUNTER — TELEPHONE (OUTPATIENT)
Dept: INTERNAL MEDICINE | Facility: CLINIC | Age: 65
End: 2020-03-13

## 2020-03-13 VITALS
WEIGHT: 250 LBS | BODY MASS INDEX: 33.13 KG/M2 | DIASTOLIC BLOOD PRESSURE: 90 MMHG | HEART RATE: 88 BPM | SYSTOLIC BLOOD PRESSURE: 132 MMHG | HEIGHT: 73 IN

## 2020-03-13 DIAGNOSIS — E78.2 MIXED HYPERLIPIDEMIA: Primary | ICD-10-CM

## 2020-03-13 DIAGNOSIS — Z00.00 ROUTINE GENERAL MEDICAL EXAMINATION AT A HEALTH CARE FACILITY: ICD-10-CM

## 2020-03-13 DIAGNOSIS — R73.03 PREDIABETES: ICD-10-CM

## 2020-03-13 DIAGNOSIS — H53.9 ABNORMAL VISION: ICD-10-CM

## 2020-03-13 DIAGNOSIS — J30.1 SEASONAL ALLERGIC RHINITIS DUE TO POLLEN: ICD-10-CM

## 2020-03-13 DIAGNOSIS — F90.0 ATTENTION DEFICIT HYPERACTIVITY DISORDER, PREDOMINANTLY INATTENTIVE TYPE: ICD-10-CM

## 2020-03-13 DIAGNOSIS — E66.09 CLASS 1 OBESITY DUE TO EXCESS CALORIES WITHOUT SERIOUS COMORBIDITY WITH BODY MASS INDEX (BMI) OF 33.0 TO 33.9 IN ADULT: ICD-10-CM

## 2020-03-13 PROCEDURE — 36415 COLL VENOUS BLD VENIPUNCTURE: CPT | Performed by: INTERNAL MEDICINE

## 2020-03-13 PROCEDURE — 99214 OFFICE O/P EST MOD 30 MIN: CPT | Performed by: INTERNAL MEDICINE

## 2020-03-13 RX ORDER — DEXTROAMPHETAMINE SACCHARATE, AMPHETAMINE ASPARTATE, DEXTROAMPHETAMINE SULFATE AND AMPHETAMINE SULFATE 2.5; 2.5; 2.5; 2.5 MG/1; MG/1; MG/1; MG/1
10 TABLET ORAL 2 TIMES DAILY
Qty: 60 TABLET | Refills: 0 | Status: SHIPPED | OUTPATIENT
Start: 2020-03-13 | End: 2020-06-09 | Stop reason: SDUPTHER

## 2020-03-13 NOTE — PROGRESS NOTES
[facility]     Cody Sanchez  1955   [id number]      Patient Care Team:  Ridge Hunter MD as PCP - General  Ridge Hunter MD as PCP - Family Medicine    Chief Complaint::   Chief Complaint   Patient presents with   • ADHD   • Asthma        HPI  Mr. Sanchez comes in for follow-up of his hyperlipidemia, prediabetes, ADHD and obesity.  He continues to follow on intermittent fasting diet.  He has lost 25 pounds since last June.  He feels well.  3 days ago he experienced left lat shimmering in his visual fields.  He was at work looking at the computer.  It lasted about 20 seconds and has not recurred.  He has had no other symptoms.  There is no chest pain, dyspnea or other neurologic deficits.  His asthma and allergies appear to be reasonably well controlled.  He experiences tinnitus, he has not noticed hearing loss.    Chronic Conditions:      Patient Active Problem List   Diagnosis   • Annual physical exam   • Gout, unspecified   • Attention deficit hyperactivity disorder, predominantly inattentive type   • Exacerbation of asthma   • Obesity   • Allergic rhinitis   • Mixed hyperlipidemia   • Prediabetes        Past Medical History:   Diagnosis Date   • Deep vein thrombosis (CMS/HCC)        Past Surgical History:   Procedure Laterality Date   • TONSILLECTOMY         Family History   Problem Relation Age of Onset   • Hypertension Mother    • Coronary artery disease Mother    • Hypertension Father    • Coronary artery disease Father    • Cancer Brother         PROSTATE CANCER        Social History     Socioeconomic History   • Marital status:      Spouse name: Not on file   • Number of children: Not on file   • Years of education: Not on file   • Highest education level: Not on file   Tobacco Use   • Smoking status: Never Smoker   • Smokeless tobacco: Never Used   Substance and Sexual Activity   • Alcohol use: No   • Drug use: No       No Known Allergies      Current  Outpatient Medications:   •  albuterol sulfate  (90 Base) MCG/ACT inhaler, Inhale 2 puffs by inhalation route every 4-6 hours as needed, Disp: 1 inhaler, Rfl: 5  •  amphetamine-dextroamphetamine (ADDERALL) 10 MG tablet, Take 1 tablet by mouth 2 (Two) Times a Day., Disp: 60 tablet, Rfl: 0  •  B Complex Vitamins (VITAMIN-B COMPLEX PO), Take 1 tablet by mouth Daily., Disp: , Rfl:   •  BIOTIN PO, Take 1 tablet by mouth Every Other Day., Disp: , Rfl:   •  budesonide-formoterol (SYMBICORT) 160-4.5 MCG/ACT inhaler, Inhale 2 puffs 2 (Two) Times a Day., Disp: 1 inhaler, Rfl: 12  •  Cholecalciferol (VITAMIN D3) 1000 units capsule, Take 1 capsule by mouth Every Other Day., Disp: , Rfl:   •  Magnesium Gluconate 550 MG tablet, Take 1 tablet by mouth Every Other Day., Disp: , Rfl:   •  MULTIPLE VITAMIN PO, Take 1 tablet by mouth Daily., Disp: , Rfl:   •  VITAMIN A PO, Take 1 tablet by mouth Daily., Disp: , Rfl:   •  vitamin C (ASCORBIC ACID) 500 MG tablet, Take 1 tablet by mouth Daily., Disp: , Rfl:   •  VITAMIN E COMPLEX PO, Take 1 tablet by mouth Every Other Day., Disp: , Rfl:   •  Zinc 50 MG tablet, Take 1 tablet by mouth Every Other Day., Disp: , Rfl:     Review of Systems   Constitutional: Negative for chills, fatigue and fever.   HENT: Negative for congestion, ear pain and sinus pressure.    Respiratory: Negative for cough, chest tightness, shortness of breath and wheezing.    Cardiovascular: Negative for chest pain and palpitations.   Gastrointestinal: Negative for abdominal pain, blood in stool and constipation.   Skin: Negative for color change.   Allergic/Immunologic: Negative for environmental allergies.   Neurological: Negative for dizziness, speech difficulty and headache.   Psychiatric/Behavioral: Negative for decreased concentration. The patient is not nervous/anxious.         Vital Signs  Vitals:    03/13/20 0818   BP: 132/90   BP Location: Left arm   Patient Position: Sitting   Cuff Size: Adult   Pulse: 88  "  Weight: 113 kg (250 lb)   Height: 184.2 cm (72.52\")   PainSc: 0-No pain       Physical Exam   Constitutional: He is oriented to person, place, and time. He appears well-developed and well-nourished.   HENT:   Head: Normocephalic and atraumatic.   Right Ear: Tympanic membrane and ear canal normal.   Left Ear: Tympanic membrane and ear canal normal.   Cardiovascular: Normal rate, regular rhythm and normal heart sounds.   No murmur heard.  Pulmonary/Chest: Effort normal and breath sounds normal.   Neurological: He is alert and oriented to person, place, and time.   Psychiatric: He has a normal mood and affect.   Vitals reviewed.     Procedures    ACE III MINI             Assessment/Plan:    Cody was seen today for adhd and asthma.    Diagnoses and all orders for this visit:    Mixed hyperlipidemia  -     Comprehensive Metabolic Panel; Future  -     Lipid Panel; Future    Prediabetes  -     Hemoglobin A1c; Future    Attention deficit hyperactivity disorder, predominantly inattentive type  -     amphetamine-dextroamphetamine (ADDERALL) 10 MG tablet; Take 1 tablet by mouth 2 (Two) Times a Day.    Class 1 obesity due to excess calories without serious comorbidity with body mass index (BMI) of 33.0 to 33.9 in adult    Seasonal allergic rhinitis due to pollen    Abnormal vision    Plan    Lipid panel is pending, treatment remains healthy diet and weight loss.    A1c is pending.  Last visit it was 6.0.  He is taken this seriously and is aggressively lost weight.    ADHD remains well controlled.  He usually takes Adderall once a day's, but sometimes twice a day.  He does not take it on the weekends.    BMI is 33.4, which represents significant weight loss over the past year.  He will continue intermittent fasting which has worked extremely well for him.    Allergies and asthma are stable.  Continue Symbicort and albuterol as needed.  Tinnitus is most likely due to sensorineural hearing loss.  He is not interested in " pursuing this at present.    Visual symptoms that he describes are nonspecific, do not sound like a retinal tear, but I suggested that he have his eyes examined in the next few weeks.      Plan of care reviewed with patient at the conclusion of today's visit. Education was provided regarding diagnosis, management, and any prescribed or recommended OTC medications.Patient verbalizes understanding of and agreement with management plan.         Ridge Hunter MD

## 2020-03-13 NOTE — TELEPHONE ENCOUNTER
Pt was asking about colonoscopy referral. Spoke to Ashleigh in referrals and it was sent to Dr. House in December. She called Dr. House's office and they said the pt needed to talk to their billing dept at [phone]. Dr. Hunter notified and called to pt

## 2020-03-14 LAB
ALBUMIN SERPL-MCNC: 4.6 G/DL (ref 3.5–5.2)
ALBUMIN/GLOB SERPL: 1.9 G/DL
ALP SERPL-CCNC: 82 U/L (ref 39–117)
ALT SERPL-CCNC: 24 U/L (ref 1–41)
AST SERPL-CCNC: 25 U/L (ref 1–40)
BILIRUB SERPL-MCNC: 0.6 MG/DL (ref 0.2–1.2)
BUN SERPL-MCNC: 12 MG/DL (ref 8–23)
BUN/CREAT SERPL: 12.2 (ref 7–25)
CALCIUM SERPL-MCNC: 9.7 MG/DL (ref 8.6–10.5)
CHLORIDE SERPL-SCNC: 101 MMOL/L (ref 98–107)
CHOLEST SERPL-MCNC: 193 MG/DL (ref 0–200)
CO2 SERPL-SCNC: 26 MMOL/L (ref 22–29)
CREAT SERPL-MCNC: 0.98 MG/DL (ref 0.76–1.27)
GLOBULIN SER CALC-MCNC: 2.4 GM/DL
GLUCOSE SERPL-MCNC: 116 MG/DL (ref 65–99)
HBA1C MFR BLD: 7.4 % (ref 4.8–5.6)
HDLC SERPL-MCNC: 37 MG/DL (ref 40–60)
LDLC SERPL CALC-MCNC: 118 MG/DL (ref 0–100)
POTASSIUM SERPL-SCNC: 5.3 MMOL/L (ref 3.5–5.2)
PROT SERPL-MCNC: 7 G/DL (ref 6–8.5)
SODIUM SERPL-SCNC: 137 MMOL/L (ref 136–145)
TRIGL SERPL-MCNC: 189 MG/DL (ref 0–150)
VLDLC SERPL CALC-MCNC: 37.8 MG/DL

## 2020-03-16 ENCOUNTER — TELEPHONE (OUTPATIENT)
Dept: INTERNAL MEDICINE | Facility: CLINIC | Age: 65
End: 2020-03-16

## 2020-03-16 NOTE — TELEPHONE ENCOUNTER
----- Message from Ridge Hunter MD sent at 3/15/2020  4:21 PM EDT -----  Let him know that despite his weight loss, his A1c, or 90 day average glucose is now 7.4, which is well above the diabetic threshold of 6.5.  This means he has diabetes.  He should keep working on weight loss as he is doing.  I want him to start metformin for diabetes.

## 2020-06-09 ENCOUNTER — OFFICE VISIT (OUTPATIENT)
Dept: INTERNAL MEDICINE | Facility: CLINIC | Age: 65
End: 2020-06-09

## 2020-06-09 VITALS
TEMPERATURE: 97.5 F | BODY MASS INDEX: 34.46 KG/M2 | HEART RATE: 80 BPM | DIASTOLIC BLOOD PRESSURE: 82 MMHG | HEIGHT: 73 IN | SYSTOLIC BLOOD PRESSURE: 130 MMHG | WEIGHT: 260 LBS

## 2020-06-09 DIAGNOSIS — F90.0 ATTENTION DEFICIT HYPERACTIVITY DISORDER, PREDOMINANTLY INATTENTIVE TYPE: Primary | ICD-10-CM

## 2020-06-09 DIAGNOSIS — E66.09 CLASS 1 OBESITY DUE TO EXCESS CALORIES WITHOUT SERIOUS COMORBIDITY WITH BODY MASS INDEX (BMI) OF 33.0 TO 33.9 IN ADULT: ICD-10-CM

## 2020-06-09 DIAGNOSIS — R73.9 HYPERGLYCEMIA: ICD-10-CM

## 2020-06-09 DIAGNOSIS — R73.03 PREDIABETES: ICD-10-CM

## 2020-06-09 DIAGNOSIS — J30.1 SEASONAL ALLERGIC RHINITIS DUE TO POLLEN: ICD-10-CM

## 2020-06-09 LAB — HBA1C MFR BLD: 5.8 %

## 2020-06-09 PROCEDURE — 83036 HEMOGLOBIN GLYCOSYLATED A1C: CPT | Performed by: INTERNAL MEDICINE

## 2020-06-09 PROCEDURE — 99214 OFFICE O/P EST MOD 30 MIN: CPT | Performed by: INTERNAL MEDICINE

## 2020-06-09 RX ORDER — DEXTROAMPHETAMINE SACCHARATE, AMPHETAMINE ASPARTATE, DEXTROAMPHETAMINE SULFATE AND AMPHETAMINE SULFATE 2.5; 2.5; 2.5; 2.5 MG/1; MG/1; MG/1; MG/1
10 TABLET ORAL 2 TIMES DAILY
Qty: 60 TABLET | Refills: 0 | Status: SHIPPED | OUTPATIENT
Start: 2020-06-09 | End: 2020-09-16 | Stop reason: SDUPTHER

## 2020-06-09 RX ORDER — BUDESONIDE AND FORMOTEROL FUMARATE DIHYDRATE 160; 4.5 UG/1; UG/1
2 AEROSOL RESPIRATORY (INHALATION)
Qty: 1 INHALER | Refills: 12 | COMMUNITY
Start: 2020-06-09 | End: 2020-06-09

## 2020-06-09 RX ORDER — BUDESONIDE AND FORMOTEROL FUMARATE DIHYDRATE 160; 4.5 UG/1; UG/1
2 AEROSOL RESPIRATORY (INHALATION)
Qty: 1 INHALER | Refills: 12 | Status: SHIPPED | OUTPATIENT
Start: 2020-06-09 | End: 2020-06-11

## 2020-06-09 NOTE — PROGRESS NOTES
[facility]     Cody Sanchez  1955   [id number]      Patient Care Team:  Ridge Hunter MD as PCP - General  Ridge Hunter MD as PCP - Family Medicine    Chief Complaint::   Chief Complaint   Patient presents with   • Hyperlipidemia   • ADD        HPI  Mr. Sanchez comes in for follow-up of his ADHD, hyperglycemia, allergies and asthma and obesity.  Last visit his A1c was 7.4.  He was instructed to start metformin but did not  the prescription as he did not understand.  He feels well today.  He is back to work and very busy.  He has gained a little weight.  He denies fever, cough, shortness of breath or chest pain.  He has not changed his diet in any way.  His ADD is well compensated on Adderall.  His allergies are slightly flared due to his daughter's cat, but he is using his inhalers and taking his antihistamine.    Chronic Conditions:      Patient Active Problem List   Diagnosis   • Annual physical exam   • Gout, unspecified   • Attention deficit hyperactivity disorder, predominantly inattentive type   • Exacerbation of asthma   • Obesity   • Allergic rhinitis   • Mixed hyperlipidemia   • Prediabetes        Past Medical History:   Diagnosis Date   • Deep vein thrombosis (CMS/HCC)        Past Surgical History:   Procedure Laterality Date   • TONSILLECTOMY         Family History   Problem Relation Age of Onset   • Hypertension Mother    • Coronary artery disease Mother    • Hypertension Father    • Coronary artery disease Father    • Cancer Brother         PROSTATE CANCER        Social History     Socioeconomic History   • Marital status:      Spouse name: Not on file   • Number of children: Not on file   • Years of education: Not on file   • Highest education level: Not on file   Tobacco Use   • Smoking status: Never Smoker   • Smokeless tobacco: Never Used   Substance and Sexual Activity   • Alcohol use: No   • Drug use: No       No Known  Allergies      Current Outpatient Medications:   •  albuterol sulfate  (90 Base) MCG/ACT inhaler, Inhale 2 puffs by inhalation route every 4-6 hours as needed, Disp: 1 inhaler, Rfl: 5  •  amphetamine-dextroamphetamine (Adderall) 10 MG tablet, Take 1 tablet by mouth 2 (Two) Times a Day., Disp: 60 tablet, Rfl: 0  •  B Complex Vitamins (VITAMIN-B COMPLEX PO), Take 1 tablet by mouth Daily., Disp: , Rfl:   •  BIOTIN PO, Take 1 tablet by mouth Every Other Day., Disp: , Rfl:   •  budesonide-formoterol (Symbicort) 160-4.5 MCG/ACT inhaler, Inhale 2 puffs 2 (Two) Times a Day., Disp: 1 inhaler, Rfl: 12  •  Cholecalciferol (VITAMIN D3) 1000 units capsule, Take 1 capsule by mouth Every Other Day., Disp: , Rfl:   •  Magnesium Gluconate 550 MG tablet, Take 1 tablet by mouth Every Other Day., Disp: , Rfl:   •  MULTIPLE VITAMIN PO, Take 1 tablet by mouth Daily., Disp: , Rfl:   •  VITAMIN A PO, Take 1 tablet by mouth Daily., Disp: , Rfl:   •  vitamin C (ASCORBIC ACID) 500 MG tablet, Take 1 tablet by mouth Daily., Disp: , Rfl:   •  VITAMIN E COMPLEX PO, Take 1 tablet by mouth Every Other Day., Disp: , Rfl:   •  Zinc 50 MG tablet, Take 1 tablet by mouth Every Other Day., Disp: , Rfl:     Review of Systems   Constitutional: Negative for chills, fatigue and fever.   HENT: Negative for congestion, ear pain and sinus pressure.    Respiratory: Negative for cough, chest tightness, shortness of breath and wheezing.    Cardiovascular: Negative for chest pain and palpitations.   Gastrointestinal: Negative for abdominal pain, blood in stool and constipation.   Skin: Negative for color change.   Allergic/Immunologic: Positive for environmental allergies.   Neurological: Negative for dizziness, speech difficulty and headache.   Psychiatric/Behavioral: Negative for decreased concentration. The patient is not nervous/anxious.         Vital Signs  Vitals:    06/09/20 0913   BP: 130/82   BP Location: Left arm   Patient Position: Sitting   Cuff  "Size: Adult   Pulse: 80   Temp: 97.5 °F (36.4 °C)   TempSrc: Temporal   Weight: 118 kg (260 lb)   Height: 184.2 cm (72.52\")   PainSc: 0-No pain       Physical Exam   Constitutional: He is oriented to person, place, and time. He appears well-developed and well-nourished.   HENT:   Head: Normocephalic and atraumatic.   Cardiovascular: Normal rate, regular rhythm and normal heart sounds.   No murmur heard.  Pulmonary/Chest: Effort normal and breath sounds normal.   Neurological: He is alert and oriented to person, place, and time.   Psychiatric: He has a normal mood and affect.   Vitals reviewed.     Procedures    ACE III MINI             Assessment/Plan:    Cody was seen today for hyperlipidemia and add.    Diagnoses and all orders for this visit:    Attention deficit hyperactivity disorder, predominantly inattentive type  -     amphetamine-dextroamphetamine (Adderall) 10 MG tablet; Take 1 tablet by mouth 2 (Two) Times a Day.    Hyperglycemia  -     POC Glycosylated Hemoglobin (Hb A1C)  -     Ambulatory Referral to Nutrition Services    Seasonal allergic rhinitis due to pollen    Prediabetes    Class 1 obesity due to excess calories without serious comorbidity with body mass index (BMI) of 33.0 to 33.9 in adult    Other orders  -     Discontinue: budesonide-formoterol (Symbicort) 160-4.5 MCG/ACT inhaler; Inhale 2 puffs 2 (Two) Times a Day.  -     budesonide-formoterol (Symbicort) 160-4.5 MCG/ACT inhaler; Inhale 2 puffs 2 (Two) Times a Day.    Plan    Attention deficit is well controlled on Adderall with good attention and focus without evidence of tolerance or misuse.    A1c in March was well within the diabetic range, so it is curious that it is down to 5.8 today without much effort and without any weight loss.  We will hold off on metformin for now.  Discussed the importance of reduced carb diet and weight loss.  Will refer to nutritional services for dietary counseling.    Allergies and asthma are reasonably " well controlled on current therapy, continue Symbicort and Ventolin as needed.    BMI is 34.8 which represents a slight weight gain from last visit.  See dietary instructions above.      Plan of care reviewed with patient at the conclusion of today's visit. Education was provided regarding diagnosis, management, and any prescribed or recommended OTC medications.Patient verbalizes understanding of and agreement with management plan.         Ridge Hunter MD

## 2020-06-11 ENCOUNTER — PRIOR AUTHORIZATION (OUTPATIENT)
Dept: INTERNAL MEDICINE | Facility: CLINIC | Age: 65
End: 2020-06-11

## 2020-06-15 RX ORDER — ALBUTEROL SULFATE 90 UG/1
AEROSOL, METERED RESPIRATORY (INHALATION)
Qty: 1 INHALER | Refills: 5 | Status: SHIPPED | OUTPATIENT
Start: 2020-06-15 | End: 2020-09-16 | Stop reason: SDUPTHER

## 2020-06-17 ENCOUNTER — HOSPITAL ENCOUNTER (OUTPATIENT)
Dept: DIABETES SERVICES | Facility: HOSPITAL | Age: 65
Setting detail: RECURRING SERIES
Discharge: HOME | End: 2020-06-17

## 2020-07-15 ENCOUNTER — HOSPITAL ENCOUNTER (OUTPATIENT)
Dept: DIABETES SERVICES | Facility: HOSPITAL | Age: 65
Setting detail: RECURRING SERIES
Discharge: HOME | End: 2020-07-15

## 2020-09-15 ENCOUNTER — TELEPHONE (OUTPATIENT)
Dept: INTERNAL MEDICINE | Facility: CLINIC | Age: 65
End: 2020-09-15

## 2020-09-16 ENCOUNTER — OFFICE VISIT (OUTPATIENT)
Dept: INTERNAL MEDICINE | Facility: CLINIC | Age: 65
End: 2020-09-16

## 2020-09-16 ENCOUNTER — LAB REQUISITION (OUTPATIENT)
Dept: LAB | Facility: HOSPITAL | Age: 65
End: 2020-09-16

## 2020-09-16 VITALS
DIASTOLIC BLOOD PRESSURE: 94 MMHG | WEIGHT: 230.2 LBS | HEART RATE: 64 BPM | HEIGHT: 73 IN | BODY MASS INDEX: 30.51 KG/M2 | SYSTOLIC BLOOD PRESSURE: 138 MMHG | TEMPERATURE: 95.9 F

## 2020-09-16 DIAGNOSIS — R73.03 PREDIABETES: ICD-10-CM

## 2020-09-16 DIAGNOSIS — E11.43 TYPE 2 DIABETES MELLITUS WITH DIABETIC AUTONOMIC NEUROPATHY, WITHOUT LONG-TERM CURRENT USE OF INSULIN (HCC): Primary | ICD-10-CM

## 2020-09-16 DIAGNOSIS — Z00.00 ROUTINE GENERAL MEDICAL EXAMINATION AT A HEALTH CARE FACILITY: ICD-10-CM

## 2020-09-16 DIAGNOSIS — F90.0 ATTENTION DEFICIT HYPERACTIVITY DISORDER, PREDOMINANTLY INATTENTIVE TYPE: ICD-10-CM

## 2020-09-16 DIAGNOSIS — E78.2 MIXED HYPERLIPIDEMIA: ICD-10-CM

## 2020-09-16 DIAGNOSIS — J30.1 SEASONAL ALLERGIC RHINITIS DUE TO POLLEN: ICD-10-CM

## 2020-09-16 PROCEDURE — 99214 OFFICE O/P EST MOD 30 MIN: CPT | Performed by: INTERNAL MEDICINE

## 2020-09-16 PROCEDURE — 36415 COLL VENOUS BLD VENIPUNCTURE: CPT | Performed by: INTERNAL MEDICINE

## 2020-09-16 RX ORDER — ALBUTEROL SULFATE 90 UG/1
AEROSOL, METERED RESPIRATORY (INHALATION)
Qty: 1 G | Refills: 5 | Status: SHIPPED | OUTPATIENT
Start: 2020-09-16 | End: 2020-12-17 | Stop reason: SDUPTHER

## 2020-09-16 RX ORDER — BUDESONIDE AND FORMOTEROL FUMARATE DIHYDRATE 160; 4.5 UG/1; UG/1
2 AEROSOL RESPIRATORY (INHALATION)
Qty: 1 INHALER | Refills: 0 | Status: SHIPPED | OUTPATIENT
Start: 2020-09-16 | End: 2020-12-17

## 2020-09-16 RX ORDER — DEXTROAMPHETAMINE SACCHARATE, AMPHETAMINE ASPARTATE, DEXTROAMPHETAMINE SULFATE AND AMPHETAMINE SULFATE 2.5; 2.5; 2.5; 2.5 MG/1; MG/1; MG/1; MG/1
10 TABLET ORAL 2 TIMES DAILY
Qty: 60 TABLET | Refills: 0 | Status: SHIPPED | OUTPATIENT
Start: 2020-09-16 | End: 2020-12-17 | Stop reason: SDUPTHER

## 2020-09-16 NOTE — PROGRESS NOTES
[facility]     Cody Sanchez  1955   [id number]      Patient Care Team:  Ridge Hunter MD as PCP - General  Ridge Hunter MD as PCP - Family Medicine    Chief Complaint::   Chief Complaint   Patient presents with   • Hyperlipidemia   • ADHD        HPI  Mr. Sanchez comes in for follow-up of his diabetes, hyperlipidemia, ADHD, allergies and asthma.  He has lost 30 pounds since his last visit.  He is done this with carb restriction.  He says he has less knee pain and that is less shortness of breath with movement.  He continues to have a good response from Adderall.  There is no fever, shortness of breath or chest pain.  His asthma appears to be well controlled.  No fever, cough, shortness of breath or chest pain.    Chronic Conditions:      Patient Active Problem List   Diagnosis   • Annual physical exam   • Gout, unspecified   • Attention deficit hyperactivity disorder, predominantly inattentive type   • Exacerbation of asthma   • Obesity   • Allergic rhinitis   • Mixed hyperlipidemia   • Prediabetes   • Type 2 diabetes mellitus with diabetic autonomic neuropathy, without long-term current use of insulin (CMS/[facility])        Past Medical History:   Diagnosis Date   • Deep vein thrombosis (CMS/[facility])        Past Surgical History:   Procedure Laterality Date   • TONSILLECTOMY         Family History   Problem Relation Age of Onset   • Hypertension Mother    • Coronary artery disease Mother    • Hypertension Father    • Coronary artery disease Father    • Cancer Brother         PROSTATE CANCER        Social History     Socioeconomic History   • Marital status:      Spouse name: Not on file   • Number of children: Not on file   • Years of education: Not on file   • Highest education level: Not on file   Tobacco Use   • Smoking status: Never Smoker   • Smokeless tobacco: Never Used   Substance and Sexual Activity   • Alcohol use: No   • Drug use: No       No Known  Allergies      Current Outpatient Medications:   •  albuterol sulfate  (90 Base) MCG/ACT inhaler, Inhale 2 puffs by inhalation route every 4-6 hours as needed, Disp: 1 g, Rfl: 5  •  amphetamine-dextroamphetamine (Adderall) 10 MG tablet, Take 1 tablet by mouth 2 (Two) Times a Day., Disp: 60 tablet, Rfl: 0  •  B Complex Vitamins (VITAMIN-B COMPLEX PO), Take 1 tablet by mouth Daily., Disp: , Rfl:   •  BIOTIN PO, Take 1 tablet by mouth Every Other Day., Disp: , Rfl:   •  Cholecalciferol (VITAMIN D3) 1000 units capsule, Take 1 capsule by mouth Every Other Day., Disp: , Rfl:   •  Magnesium Gluconate 550 MG tablet, Take 1 tablet by mouth Every Other Day., Disp: , Rfl:   •  MULTIPLE VITAMIN PO, Take 1 tablet by mouth Daily., Disp: , Rfl:   •  VITAMIN A PO, Take 1 tablet by mouth Daily., Disp: , Rfl:   •  vitamin C (ASCORBIC ACID) 500 MG tablet, Take 1 tablet by mouth Daily., Disp: , Rfl:   •  VITAMIN E COMPLEX PO, Take 1 tablet by mouth Every Other Day., Disp: , Rfl:   •  Zinc 50 MG tablet, Take 1 tablet by mouth Every Other Day., Disp: , Rfl:   •  Fluticasone Furoate-Vilanterol (Breo Ellipta) 100-25 MCG/INH inhaler, Inhale 1 puff Daily., Disp: 28 each, Rfl: 5    Review of Systems   Constitutional: Negative for chills, fatigue and fever.   HENT: Negative for congestion, ear pain and sinus pressure.    Respiratory: Negative for cough, chest tightness, shortness of breath and wheezing.    Cardiovascular: Negative for chest pain and palpitations.   Gastrointestinal: Negative for abdominal pain, blood in stool and constipation.   Skin: Negative for color change.   Allergic/Immunologic: Negative for environmental allergies.   Neurological: Negative for dizziness, speech difficulty and headache.   Psychiatric/Behavioral: Negative for decreased concentration. The patient is not nervous/anxious.         Vital Signs  Vitals:    09/16/20 0847 09/16/20 0936   BP: 142/98 138/94   BP Location: Left arm Left arm   Patient  "Position: Sitting Sitting   Cuff Size: Adult Adult   Pulse: 64    Temp: 95.9 °F (35.5 °C)    Weight: 104 kg (230 lb 3.2 oz)    Height: 184.2 cm (72.52\")    PainSc:   6    PainLoc: Back        Physical Exam  Vitals signs reviewed.   Constitutional:       Appearance: He is well-developed.   HENT:      Head: Normocephalic and atraumatic.   Cardiovascular:      Rate and Rhythm: Normal rate and regular rhythm.      Heart sounds: Normal heart sounds. No murmur.   Pulmonary:      Effort: Pulmonary effort is normal.      Breath sounds: Normal breath sounds.   Neurological:      Mental Status: He is alert and oriented to person, place, and time.        Procedures    ACE III MINI             Assessment/Plan:    Cody was seen today for hyperlipidemia and adhd.    Diagnoses and all orders for this visit:    Type 2 diabetes mellitus with diabetic autonomic neuropathy, without long-term current use of insulin (CMS/[facility])    Attention deficit hyperactivity disorder, predominantly inattentive type  -     amphetamine-dextroamphetamine (Adderall) 10 MG tablet; Take 1 tablet by mouth 2 (Two) Times a Day.    Prediabetes  -     Comprehensive Metabolic Panel; Future  -     Hemoglobin A1c; Future  -     Hemoglobin A1c  -     Comprehensive Metabolic Panel    Mixed hyperlipidemia  -     Comprehensive Metabolic Panel; Future  -     Lipid Panel; Future  -     Lipid Panel  -     Comprehensive Metabolic Panel    Seasonal allergic rhinitis due to pollen    Other orders  -     albuterol sulfate  (90 Base) MCG/ACT inhaler; Inhale 2 puffs by inhalation route every 4-6 hours as needed  -     Fluticasone Furoate-Vilanterol (Breo Ellipta) 100-25 MCG/INH inhaler; Inhale 1 puff Daily.    Plan    A1c is pending, his 30 pound weight loss should bring his glucose down nicely.    ADHD remains well controlled on Adderall.    Lipid panel is pending, continue healthy diet, weight loss and exercise.    Allergies and asthma are well controlled with " albuterol, Breo and as needed antihistamines.      Plan of care reviewed with patient at the conclusion of today's visit. Education was provided regarding diagnosis, management, and any prescribed or recommended OTC medications.Patient verbalizes understanding of and agreement with management plan.         Ridge Hunter MD           Answers for HPI/ROS submitted by the patient on 9/14/2020   What is the primary reason for your visit?: Other  Please describe your symptoms.: No symptoms. Follow up visit.  Have you had these symptoms before?: No  How long have you been having these symptoms?: 1-4 days  Please list any medications you are currently taking for this condition.: Have questions about the two different types of inhalers I have. , Still taking the vitamins I've been taking all along.  Please describe any probable cause for these symptoms. : N/A

## 2020-09-17 LAB
ALBUMIN SERPL-MCNC: 4.8 G/DL (ref 3.5–5.2)
ALBUMIN/GLOB SERPL: 2.7 G/DL
ALP SERPL-CCNC: 79 U/L (ref 39–117)
ALT SERPL-CCNC: 20 U/L (ref 1–41)
AST SERPL-CCNC: 25 U/L (ref 1–40)
BILIRUB SERPL-MCNC: 0.7 MG/DL (ref 0–1.2)
BUN SERPL-MCNC: 17 MG/DL (ref 8–23)
BUN/CREAT SERPL: 19.8 (ref 7–25)
CALCIUM SERPL-MCNC: 9.7 MG/DL (ref 8.6–10.5)
CHLORIDE SERPL-SCNC: 100 MMOL/L (ref 98–107)
CHOLEST SERPL-MCNC: 178 MG/DL (ref 0–200)
CO2 SERPL-SCNC: 23 MMOL/L (ref 22–29)
CREAT SERPL-MCNC: 0.86 MG/DL (ref 0.76–1.27)
GLOBULIN SER CALC-MCNC: 1.8 GM/DL
GLUCOSE SERPL-MCNC: 98 MG/DL (ref 65–99)
HBA1C MFR BLD: 5.7 % (ref 4.8–5.6)
HDLC SERPL-MCNC: 41 MG/DL (ref 40–60)
LDLC SERPL CALC-MCNC: 121 MG/DL (ref 0–100)
POTASSIUM SERPL-SCNC: 5.6 MMOL/L (ref 3.5–5.2)
PROT SERPL-MCNC: 6.6 G/DL (ref 6–8.5)
SODIUM SERPL-SCNC: 145 MMOL/L (ref 136–145)
TRIGL SERPL-MCNC: 80 MG/DL (ref 0–150)
VLDLC SERPL CALC-MCNC: 16 MG/DL

## 2020-12-16 ENCOUNTER — TELEPHONE (OUTPATIENT)
Dept: INTERNAL MEDICINE | Facility: CLINIC | Age: 65
End: 2020-12-16

## 2020-12-16 NOTE — TELEPHONE ENCOUNTER
Patient requested a call back. Patient has an appointment with Dr. Hunter on 12/17/20 and would like to know if this will need fasting lab work.    Please call and advise. Patient call back [phone]

## 2020-12-16 NOTE — TELEPHONE ENCOUNTER
No, we did fasting labs last time.  We will do a fingerstick for A1c in the office.  This does not require fasting.

## 2020-12-17 ENCOUNTER — OFFICE VISIT (OUTPATIENT)
Dept: INTERNAL MEDICINE | Facility: CLINIC | Age: 65
End: 2020-12-17

## 2020-12-17 VITALS
SYSTOLIC BLOOD PRESSURE: 148 MMHG | DIASTOLIC BLOOD PRESSURE: 88 MMHG | HEIGHT: 73 IN | WEIGHT: 220.4 LBS | OXYGEN SATURATION: 98 % | HEART RATE: 73 BPM | BODY MASS INDEX: 29.21 KG/M2 | TEMPERATURE: 96.9 F

## 2020-12-17 DIAGNOSIS — J01.00 ACUTE NON-RECURRENT MAXILLARY SINUSITIS: ICD-10-CM

## 2020-12-17 DIAGNOSIS — Z12.5 SPECIAL SCREENING FOR MALIGNANT NEOPLASM OF PROSTATE: ICD-10-CM

## 2020-12-17 DIAGNOSIS — E11.43 TYPE 2 DIABETES MELLITUS WITH DIABETIC AUTONOMIC NEUROPATHY, WITHOUT LONG-TERM CURRENT USE OF INSULIN (HCC): Primary | ICD-10-CM

## 2020-12-17 DIAGNOSIS — F90.0 ATTENTION DEFICIT HYPERACTIVITY DISORDER, PREDOMINANTLY INATTENTIVE TYPE: ICD-10-CM

## 2020-12-17 DIAGNOSIS — E78.2 MIXED HYPERLIPIDEMIA: ICD-10-CM

## 2020-12-17 LAB — HBA1C MFR BLD: 5.3 %

## 2020-12-17 PROCEDURE — 90471 IMMUNIZATION ADMIN: CPT | Performed by: INTERNAL MEDICINE

## 2020-12-17 PROCEDURE — 83036 HEMOGLOBIN GLYCOSYLATED A1C: CPT | Performed by: INTERNAL MEDICINE

## 2020-12-17 PROCEDURE — 90694 VACC AIIV4 NO PRSRV 0.5ML IM: CPT | Performed by: INTERNAL MEDICINE

## 2020-12-17 PROCEDURE — 99214 OFFICE O/P EST MOD 30 MIN: CPT | Performed by: INTERNAL MEDICINE

## 2020-12-17 RX ORDER — ALBUTEROL SULFATE 90 UG/1
AEROSOL, METERED RESPIRATORY (INHALATION)
Qty: 1 G | Refills: 5 | Status: SHIPPED | OUTPATIENT
Start: 2020-12-17 | End: 2021-06-24 | Stop reason: SDUPTHER

## 2020-12-17 RX ORDER — DEXTROAMPHETAMINE SACCHARATE, AMPHETAMINE ASPARTATE, DEXTROAMPHETAMINE SULFATE AND AMPHETAMINE SULFATE 2.5; 2.5; 2.5; 2.5 MG/1; MG/1; MG/1; MG/1
10 TABLET ORAL 2 TIMES DAILY
Qty: 60 TABLET | Refills: 0 | Status: SHIPPED | OUTPATIENT
Start: 2020-12-17 | End: 2021-02-22 | Stop reason: SDUPTHER

## 2020-12-17 RX ORDER — AMOXICILLIN 500 MG/1
1000 CAPSULE ORAL 2 TIMES DAILY
Qty: 14 CAPSULE | Refills: 0 | Status: SHIPPED | OUTPATIENT
Start: 2020-12-17 | End: 2020-12-21 | Stop reason: SDUPTHER

## 2020-12-17 NOTE — PROGRESS NOTES
[facility]     Cody Sanchez  1955   [id number]      Patient Care Team:  Ridge Hunter MD as PCP - General  Ridge Hunter MD as PCP - Family Medicine    Chief Complaint::   Chief Complaint   Patient presents with   • Diabetes     3 mo. f/u    • Sinusitis     sinus pressure when bending over , puffiness Rt. nostral area , Rt. ear pain , congestion ,         HPI  Mr. Sanchez comes in for follow-up of his diabetes and ADD.  He continues to work hard on diet and is lost another 10 pounds.  For the past week or so he has developed facial pain, right ear pain and some congestion.  There has been no fever or chills.  His ADD is well compensated on current medication.    Chronic Conditions:      Patient Active Problem List   Diagnosis   • Annual physical exam   • Gout, unspecified   • Attention deficit hyperactivity disorder, predominantly inattentive type   • Exacerbation of asthma   • Obesity   • Allergic rhinitis   • Mixed hyperlipidemia   • Prediabetes   • Type 2 diabetes mellitus with diabetic autonomic neuropathy, without long-term current use of insulin (CMS/[facility])        Past Medical History:   Diagnosis Date   • Deep vein thrombosis (CMS/[facility])        Past Surgical History:   Procedure Laterality Date   • TONSILLECTOMY         Family History   Problem Relation Age of Onset   • Hypertension Mother    • Coronary artery disease Mother    • Hypertension Father    • Coronary artery disease Father    • Cancer Brother         PROSTATE CANCER        Social History     Socioeconomic History   • Marital status:      Spouse name: Not on file   • Number of children: Not on file   • Years of education: Not on file   • Highest education level: Not on file   Tobacco Use   • Smoking status: Never Smoker   • Smokeless tobacco: Never Used   Substance and Sexual Activity   • Alcohol use: No   • Drug use: No       No Known Allergies      Current Outpatient Medications:   •  albuterol  sulfate  (90 Base) MCG/ACT inhaler, Inhale 2 puffs by inhalation route every 4-6 hours as needed, Disp: 1 g, Rfl: 5  •  amphetamine-dextroamphetamine (Adderall) 10 MG tablet, Take 1 tablet by mouth 2 (Two) Times a Day., Disp: 60 tablet, Rfl: 0  •  B Complex Vitamins (VITAMIN-B COMPLEX PO), Take 1 tablet by mouth Daily., Disp: , Rfl:   •  BIOTIN PO, Take 1 tablet by mouth Every Other Day., Disp: , Rfl:   •  Cholecalciferol (VITAMIN D3) 1000 units capsule, Take 1 capsule by mouth Every Other Day., Disp: , Rfl:   •  Fluticasone Furoate-Vilanterol (Breo Ellipta) 100-25 MCG/INH inhaler, Inhale 1 puff Daily., Disp: 1 each, Rfl: 5  •  Magnesium Gluconate 550 MG tablet, Take 1 tablet by mouth Every Other Day., Disp: , Rfl:   •  MULTIPLE VITAMIN PO, Take 1 tablet by mouth Daily., Disp: , Rfl:   •  VITAMIN A PO, Take 1 tablet by mouth Daily., Disp: , Rfl:   •  vitamin C (ASCORBIC ACID) 500 MG tablet, Take 1 tablet by mouth Daily., Disp: , Rfl:   •  VITAMIN E COMPLEX PO, Take 1 tablet by mouth Every Other Day., Disp: , Rfl:   •  Zinc 50 MG tablet, Take 1 tablet by mouth Every Other Day., Disp: , Rfl:   •  amoxicillin (AMOXIL) 500 MG capsule, Take 2 capsules by mouth 2 (Two) Times a Day., Disp: 14 capsule, Rfl: 0    Review of Systems   Constitutional: Negative for chills, fatigue and fever.   HENT: Positive for congestion, ear pain and sinus pressure.    Respiratory: Negative for cough, chest tightness, shortness of breath and wheezing.    Cardiovascular: Negative for chest pain and palpitations.   Gastrointestinal: Negative for abdominal pain, blood in stool and constipation.   Skin: Negative for color change.   Allergic/Immunologic: Positive for environmental allergies.   Neurological: Negative for dizziness, speech difficulty and headache.   Psychiatric/Behavioral: Negative for decreased concentration. The patient is not nervous/anxious.         Vital Signs  Vitals:    12/17/20 0927   BP: 148/88   BP Location: Left  "arm   Patient Position: Sitting   Cuff Size: Adult   Pulse: 73   Temp: 96.9 °F (36.1 °C)   TempSrc: Temporal   SpO2: 98%   Weight: 100 kg (220 lb 6.4 oz)   Height: 184.2 cm (72.52\")   PainSc:   4   PainLoc: Ear       Physical Exam  Vitals signs reviewed.   Constitutional:       Appearance: He is well-developed.   HENT:      Head: Normocephalic and atraumatic.      Nose:      Right Sinus: Maxillary sinus tenderness present.   Cardiovascular:      Rate and Rhythm: Normal rate and regular rhythm.      Heart sounds: Normal heart sounds. No murmur.   Pulmonary:      Effort: Pulmonary effort is normal.      Breath sounds: Normal breath sounds.   Neurological:      Mental Status: He is alert and oriented to person, place, and time.          Procedures    ACE III MINI             Assessment/Plan:    Diagnoses and all orders for this visit:    1. Type 2 diabetes mellitus with diabetic autonomic neuropathy, without long-term current use of insulin (CMS/[facility]) (Primary)  -     POC Glycosylated Hemoglobin (Hb A1C)    2. Attention deficit hyperactivity disorder, predominantly inattentive type  -     amphetamine-dextroamphetamine (Adderall) 10 MG tablet; Take 1 tablet by mouth 2 (Two) Times a Day.  Dispense: 60 tablet; Refill: 0    3. Acute non-recurrent maxillary sinusitis    Other orders  -     amoxicillin (AMOXIL) 500 MG capsule; Take 2 capsules by mouth 2 (Two) Times a Day.  Dispense: 14 capsule; Refill: 0  -     Fluad Quad >65 years  -     Fluticasone Furoate-Vilanterol (Breo Ellipta) 100-25 MCG/INH inhaler; Inhale 1 puff Daily.  Dispense: 1 each; Refill: 5  -     albuterol sulfate  (90 Base) MCG/ACT inhaler; Inhale 2 puffs by inhalation route every 4-6 hours as needed  Dispense: 1 g; Refill: 5    Plan    A1c is now within normal limits at 5.3.  This is down from 7.4 in March.  He has accomplished this with diet and weight loss alone.  I applauded his efforts and encouraged him to keep it.    ADD is well controlled on " Adderall.    He is given amoxicillin for sinusitis.  He will continue taking antihistamines and decongestants.      Plan of care reviewed with patient at the conclusion of today's visit. Education was provided regarding diagnosis, management, and any prescribed or recommended OTC medications.Patient verbalizes understanding of and agreement with management plan.         Ridge Hunter MD           Answers for HPI/ROS submitted by the patient on 12/16/2020   What is the primary reason for your visit?: Other  Please describe your symptoms.: Primary appointment made for 3 mth follow up visit. Called today, told no blood work needed. Additionally, believe I have onset of sinus infection. Area under rt nostril very tender and feels puffy to touch. Ibuprofen and ice to relieve so far.  Have you had these symptoms before?: Yes  How long have you been having these symptoms?: 1-4 days  Please list any medications you are currently taking for this condition.: Ibuprofen &/or naproxen sodium to relieve puffiness and tenderness. Also some relief from nasal rinsing.  Please describe any probable cause for these symptoms. : It's that time of year is my best guess. Finally turned my furnace on (& changed filter) so believe result of pet dander, etc., being mixed around and messing w/my allergies/sinuses.

## 2020-12-21 RX ORDER — AMOXICILLIN 500 MG/1
1000 CAPSULE ORAL 2 TIMES DAILY
Qty: 28 CAPSULE | Refills: 0 | Status: SHIPPED | OUTPATIENT
Start: 2020-12-21 | End: 2021-03-23

## 2021-02-22 DIAGNOSIS — F90.0 ATTENTION DEFICIT HYPERACTIVITY DISORDER, PREDOMINANTLY INATTENTIVE TYPE: ICD-10-CM

## 2021-02-22 RX ORDER — DEXTROAMPHETAMINE SACCHARATE, AMPHETAMINE ASPARTATE, DEXTROAMPHETAMINE SULFATE AND AMPHETAMINE SULFATE 2.5; 2.5; 2.5; 2.5 MG/1; MG/1; MG/1; MG/1
10 TABLET ORAL 2 TIMES DAILY
Qty: 60 TABLET | Refills: 0 | Status: SHIPPED | OUTPATIENT
Start: 2021-02-22 | End: 2021-03-23 | Stop reason: SDUPTHER

## 2021-02-22 NOTE — TELEPHONE ENCOUNTER
Caller: Cody Sanchez    Relationship: Self    Best call back number: [phone]    Medication needed:   Requested Prescriptions     Pending Prescriptions Disp Refills   • amphetamine-dextroamphetamine (Adderall) 10 MG tablet 60 tablet 0     Sig: Take 1 tablet by mouth 2 (Two) Times a Day.       When do you need the refill by: 2/22/2021    What details did the patient provide when requesting the medication: PATIENT IS COMPLETLEY OUT OF MEDICATION. PATIENT WOULD LIKE TO SEE IF HE CAN GET THE 90 DAY SUPPLY OF THE GENERIC BRAND OF ADDERALL. HE WOULD LIKE TO MAKE SURE THAT GENERIC WORKS THE SAME AS THE ORIGINAL BEFORE ASKING TO SWITCHED OVER. PLEASE GIVE PATIENT A CALL BACK.    Does the patient have less than a 3 day supply:  [x] Yes  [] No    What is the patient's preferred pharmacy: [id number] [address] - [phone]  - [phone] FX

## 2021-03-22 ENCOUNTER — TELEPHONE (OUTPATIENT)
Dept: INTERNAL MEDICINE | Facility: CLINIC | Age: 66
End: 2021-03-22

## 2021-03-22 DIAGNOSIS — Z12.5 SPECIAL SCREENING FOR MALIGNANT NEOPLASM OF PROSTATE: ICD-10-CM

## 2021-03-22 DIAGNOSIS — E11.43 TYPE 2 DIABETES MELLITUS WITH DIABETIC AUTONOMIC NEUROPATHY, WITHOUT LONG-TERM CURRENT USE OF INSULIN (HCC): Primary | ICD-10-CM

## 2021-03-22 DIAGNOSIS — E78.2 MIXED HYPERLIPIDEMIA: ICD-10-CM

## 2021-03-22 LAB
ALBUMIN SERPL-MCNC: 4.5 G/DL (ref 3.5–5.2)
ALBUMIN/GLOB SERPL: 2 G/DL
ALP SERPL-CCNC: 79 U/L (ref 39–117)
ALT SERPL-CCNC: 15 U/L (ref 1–41)
AST SERPL-CCNC: 20 U/L (ref 1–40)
BASOPHILS # BLD AUTO: 0.1 10*3/MM3 (ref 0–0.2)
BASOPHILS NFR BLD AUTO: 1.7 % (ref 0–1.5)
BILIRUB SERPL-MCNC: 1.1 MG/DL (ref 0–1.2)
BUN SERPL-MCNC: 12 MG/DL (ref 8–23)
BUN/CREAT SERPL: 13.8 (ref 7–25)
CALCIUM SERPL-MCNC: 10.1 MG/DL (ref 8.6–10.5)
CHLORIDE SERPL-SCNC: 104 MMOL/L (ref 98–107)
CHOLEST SERPL-MCNC: 191 MG/DL (ref 0–200)
CO2 SERPL-SCNC: 26.2 MMOL/L (ref 22–29)
CREAT SERPL-MCNC: 0.87 MG/DL (ref 0.76–1.27)
EOSINOPHIL # BLD AUTO: 0.23 10*3/MM3 (ref 0–0.4)
EOSINOPHIL NFR BLD AUTO: 4 % (ref 0.3–6.2)
ERYTHROCYTE [DISTWIDTH] IN BLOOD BY AUTOMATED COUNT: 12.7 % (ref 12.3–15.4)
GLOBULIN SER CALC-MCNC: 2.2 GM/DL
GLUCOSE SERPL-MCNC: 106 MG/DL (ref 65–99)
HBA1C MFR BLD: 5.5 % (ref 4.8–5.6)
HCT VFR BLD AUTO: 43.3 % (ref 37.5–51)
HDLC SERPL-MCNC: 49 MG/DL (ref 40–60)
HGB BLD-MCNC: 15.1 G/DL (ref 13–17.7)
IMM GRANULOCYTES # BLD AUTO: 0.01 10*3/MM3 (ref 0–0.05)
IMM GRANULOCYTES NFR BLD AUTO: 0.2 % (ref 0–0.5)
LDLC SERPL CALC-MCNC: 132 MG/DL (ref 0–100)
LYMPHOCYTES # BLD AUTO: 1.08 10*3/MM3 (ref 0.7–3.1)
LYMPHOCYTES NFR BLD AUTO: 18.9 % (ref 19.6–45.3)
MCH RBC QN AUTO: 30.5 PG (ref 26.6–33)
MCHC RBC AUTO-ENTMCNC: 34.9 G/DL (ref 31.5–35.7)
MCV RBC AUTO: 87.5 FL (ref 79–97)
MONOCYTES # BLD AUTO: 0.56 10*3/MM3 (ref 0.1–0.9)
MONOCYTES NFR BLD AUTO: 9.8 % (ref 5–12)
NEUTROPHILS # BLD AUTO: 3.74 10*3/MM3 (ref 1.7–7)
NEUTROPHILS NFR BLD AUTO: 65.4 % (ref 42.7–76)
NRBC BLD AUTO-RTO: 0 /100 WBC (ref 0–0.2)
PLATELET # BLD AUTO: 329 10*3/MM3 (ref 140–450)
POTASSIUM SERPL-SCNC: 4.8 MMOL/L (ref 3.5–5.2)
PROT SERPL-MCNC: 6.7 G/DL (ref 6–8.5)
PSA SERPL-MCNC: 0.2 NG/ML (ref 0–4)
RBC # BLD AUTO: 4.95 10*6/MM3 (ref 4.14–5.8)
SODIUM SERPL-SCNC: 140 MMOL/L (ref 136–145)
TRIGL SERPL-MCNC: 52 MG/DL (ref 0–150)
VLDLC SERPL CALC-MCNC: 10 MG/DL (ref 5–40)
WBC # BLD AUTO: 5.72 10*3/MM3 (ref 3.4–10.8)

## 2021-03-22 NOTE — TELEPHONE ENCOUNTER
PT REQUESTING FUTURE LAB ORDERS FOR APPOINTMENT IN THE MORNING. HE WANTED AN ORDER FOR THIS MOR,ING AND I EXPLAINED TO HIM OUR POLICY OF CALLING A WEEK PRIOR TO HIS APPOINTMENT FOR LAB ORDERS. PT WAS NOT VERY HAPPY AND STATED THAT IT IS OUR JOB TO LOOK AHEAD AND HAVE ORDERS PUT IN WITHOUT HIM HAVING TO CALL.

## 2021-03-22 NOTE — TELEPHONE ENCOUNTER
Dr. Hunter is out today and pt has appt at 9:15 in the morning. He would like to have labs prior. Would you please put in order for CMP, Lipid, CBC, A1C, and PSA?

## 2021-03-23 ENCOUNTER — OFFICE VISIT (OUTPATIENT)
Dept: INTERNAL MEDICINE | Facility: CLINIC | Age: 66
End: 2021-03-23

## 2021-03-23 VITALS
WEIGHT: 212.8 LBS | SYSTOLIC BLOOD PRESSURE: 132 MMHG | HEART RATE: 72 BPM | DIASTOLIC BLOOD PRESSURE: 88 MMHG | HEIGHT: 73 IN | TEMPERATURE: 96.8 F | BODY MASS INDEX: 28.2 KG/M2

## 2021-03-23 DIAGNOSIS — J45.20 MILD INTERMITTENT ASTHMA WITHOUT COMPLICATION: ICD-10-CM

## 2021-03-23 DIAGNOSIS — Z12.11 SCREEN FOR COLON CANCER: ICD-10-CM

## 2021-03-23 DIAGNOSIS — F90.0 ATTENTION DEFICIT HYPERACTIVITY DISORDER, PREDOMINANTLY INATTENTIVE TYPE: ICD-10-CM

## 2021-03-23 DIAGNOSIS — M1A.00X0 IDIOPATHIC CHRONIC GOUT WITHOUT TOPHUS, UNSPECIFIED SITE: ICD-10-CM

## 2021-03-23 DIAGNOSIS — J30.1 SEASONAL ALLERGIC RHINITIS DUE TO POLLEN: ICD-10-CM

## 2021-03-23 DIAGNOSIS — Z00.00 ANNUAL PHYSICAL EXAM: Primary | ICD-10-CM

## 2021-03-23 DIAGNOSIS — E11.65 TYPE 2 DIABETES MELLITUS WITH HYPERGLYCEMIA, WITHOUT LONG-TERM CURRENT USE OF INSULIN (HCC): ICD-10-CM

## 2021-03-23 DIAGNOSIS — E78.2 MIXED HYPERLIPIDEMIA: ICD-10-CM

## 2021-03-23 PROBLEM — R73.03 PREDIABETES: Status: RESOLVED | Noted: 2020-03-13 | Resolved: 2021-03-23

## 2021-03-23 PROCEDURE — 99397 PER PM REEVAL EST PAT 65+ YR: CPT | Performed by: INTERNAL MEDICINE

## 2021-03-23 RX ORDER — DEXTROAMPHETAMINE SACCHARATE, AMPHETAMINE ASPARTATE, DEXTROAMPHETAMINE SULFATE AND AMPHETAMINE SULFATE 2.5; 2.5; 2.5; 2.5 MG/1; MG/1; MG/1; MG/1
10 TABLET ORAL 2 TIMES DAILY
Qty: 60 TABLET | Refills: 0 | Status: SHIPPED | OUTPATIENT
Start: 2021-03-23 | End: 2021-06-24 | Stop reason: SDUPTHER

## 2021-03-23 NOTE — PROGRESS NOTES
[facility]     Cody Sanchez  1955   [id number]      Patient Care Team:  Ridge Hunter MD as PCP - General  Ridge Hunter MD as PCP - Family Medicine    Chief Complaint::   Chief Complaint   Patient presents with   • Annual Exam        HPI  Mr. Sanchez is now 65.  He comes in for follow-up of his diabetes, ADD, gout, hyperlipidemia, allergies and asthma, and for annual examination.  He has lost another 8 pounds since he was here 3 months ago.  He has lost about 40pounds since he was diagnosed with diabetes about a year ago.  He feels very well.  He has worked hard on reducing carbohydrates in his diet.  He does note that his bowel movements are not as regular as they once were.  No bleeding or pain.  Strength and stamina are good.  He has not yet had a Covid vaccine.  There is no fever, cough, shortness of breath or chest pain.  He has started to notice some mild wheezing as we have seen the onset of spring.    Chronic Conditions:      Patient Active Problem List   Diagnosis   • Annual physical exam   • Gout, unspecified   • Attention deficit hyperactivity disorder, predominantly inattentive type   • Exacerbation of asthma   • Obesity   • Allergic rhinitis   • Mixed hyperlipidemia   • Type 2 diabetes mellitus with diabetic autonomic neuropathy, without long-term current use of insulin (CMS/[facility])        Past Medical History:   Diagnosis Date   • Deep vein thrombosis (CMS/[facility])        Past Surgical History:   Procedure Laterality Date   • TONSILLECTOMY         Family History   Problem Relation Age of Onset   • Hypertension Mother    • Coronary artery disease Mother    • Hypertension Father    • Coronary artery disease Father    • Cancer Brother         PROSTATE CANCER        Social History     Socioeconomic History   • Marital status:      Spouse name: Not on file   • Number of children: Not on file   • Years of education: Not on file   • Highest education level: Not on  file   Tobacco Use   • Smoking status: Never Smoker   • Smokeless tobacco: Never Used   Substance and Sexual Activity   • Alcohol use: No   • Drug use: No       No Known Allergies      Current Outpatient Medications:   •  albuterol sulfate  (90 Base) MCG/ACT inhaler, Inhale 2 puffs by inhalation route every 4-6 hours as needed, Disp: 1 g, Rfl: 5  •  amphetamine-dextroamphetamine (Adderall) 10 MG tablet, Take 1 tablet by mouth 2 (Two) Times a Day., Disp: 60 tablet, Rfl: 0  •  B Complex Vitamins (VITAMIN-B COMPLEX PO), Take 1 tablet by mouth Daily., Disp: , Rfl:   •  BIOTIN PO, Take 1 tablet by mouth Every Other Day., Disp: , Rfl:   •  Cholecalciferol (VITAMIN D3) 1000 units capsule, Take 1 capsule by mouth Every Other Day., Disp: , Rfl:   •  Magnesium Gluconate 550 MG tablet, Take 1 tablet by mouth Every Other Day., Disp: , Rfl:   •  MULTIPLE VITAMIN PO, Take 1 tablet by mouth Daily., Disp: , Rfl:   •  VITAMIN A PO, Take 1 tablet by mouth Daily., Disp: , Rfl:   •  vitamin C (ASCORBIC ACID) 500 MG tablet, Take 1 tablet by mouth Daily., Disp: , Rfl:   •  VITAMIN E COMPLEX PO, Take 1 tablet by mouth Every Other Day., Disp: , Rfl:   •  fluticasone-salmeterol (Advair Diskus) 100-50 MCG/DOSE DISKUS, Inhale 1 puff 2 (Two) Times a Day., Disp: 60 each, Rfl: 5  •  Zinc 50 MG tablet, Take 1 tablet by mouth Every Other Day., Disp: , Rfl:     Immunization History   Administered Date(s) Administered   • Fluad Quad 65+ 12/17/2020   • Tdap 04/10/2009        Health Maintenance Due   Topic Date Due   • URINE MICROALBUMIN  Never done   • COVID-19 Vaccine (1) Never done   • ZOSTER VACCINE (1 of 2) Never done   • COLONOSCOPY  02/14/2011   • TDAP/TD VACCINES (2 - Td) 04/10/2019   • HEPATITIS C SCREENING  Never done   • DIABETIC FOOT EXAM  Never done   • DIABETIC EYE EXAM  07/30/2019   • Pneumococcal Vaccine 65+ (1 of 1 - PPSV23) Never done   • ANNUAL PHYSICAL  12/10/2020        Review of Systems   Constitutional: Negative for  "chills, fatigue and fever.   HENT: Positive for congestion. Negative for ear pain and sinus pressure.    Respiratory: Positive for shortness of breath. Negative for cough, chest tightness and wheezing.    Cardiovascular: Negative for chest pain and palpitations.   Gastrointestinal: Positive for constipation. Negative for abdominal pain and blood in stool.   Skin: Negative for color change.   Allergic/Immunologic: Positive for environmental allergies.   Neurological: Negative for dizziness, speech difficulty and headache.   Psychiatric/Behavioral: Negative for decreased concentration. The patient is not nervous/anxious.         Vital Signs  Vitals:    03/23/21 0918   BP: 132/88   BP Location: Left arm   Patient Position: Sitting   Cuff Size: Adult   Pulse: 72   Temp: 96.8 °F (36 °C)   Weight: 96.5 kg (212 lb 12.8 oz)   Height: 184.2 cm (72.52\")   PainSc: 0-No pain       Physical Exam  Vitals and nursing note reviewed.   Constitutional:       Appearance: He is well-developed.   HENT:      Head: Normocephalic and atraumatic.      Right Ear: External ear normal.      Left Ear: External ear normal.      Nose: Nose normal.      Mouth/Throat:      Pharynx: No oropharyngeal exudate.   Eyes:      Conjunctiva/sclera: Conjunctivae normal.      Pupils: Pupils are equal, round, and reactive to light.   Neck:      Thyroid: No thyromegaly.      Vascular: No JVD.   Cardiovascular:      Rate and Rhythm: Normal rate and regular rhythm.      Pulses:           Dorsalis pedis pulses are 1+ on the right side and 1+ on the left side.      Heart sounds: Normal heart sounds. No murmur heard.   No friction rub. No gallop.    Pulmonary:      Effort: Pulmonary effort is normal. No respiratory distress.      Breath sounds: Normal breath sounds. No wheezing or rales.      Comments: Wheezing throughout on forced expiration.  Chest:      Chest wall: No tenderness.   Abdominal:      General: Bowel sounds are normal. There is no distension.      " Palpations: Abdomen is soft. There is no mass.      Tenderness: There is no abdominal tenderness. There is no guarding or rebound.      Hernia: No hernia is present.   Musculoskeletal:         General: No tenderness. Normal range of motion.      Cervical back: Normal range of motion and neck supple.      Right foot: No deformity.      Left foot: No deformity.   Feet:      Right foot:      Protective Sensation: 5 sites tested. 5 sites sensed.      Skin integrity: Skin integrity normal.      Left foot:      Protective Sensation: 5 sites tested. 5 sites sensed.      Skin integrity: Skin integrity normal.      Comments: Sensation in both feet slightly reduced to monofilament.     Diabetic Foot Exam Performed and Monofilament Test Performed    Lymphadenopathy:      Cervical: No cervical adenopathy.   Skin:     General: Skin is warm and dry.      Findings: No erythema or rash.   Neurological:      Mental Status: He is alert and oriented to person, place, and time.      Cranial Nerves: No cranial nerve deficit.      Sensory: No sensory deficit.      Motor: No abnormal muscle tone.      Coordination: Coordination normal.      Deep Tendon Reflexes: Reflexes normal.   Psychiatric:         Behavior: Behavior normal.         Thought Content: Thought content normal.         Judgment: Judgment normal.          Procedures     Fall Risk Screen:  STEADI Fall Risk Assessment has not been completed.    Health Habits and Functional and Cognitive Screening:  No flowsheet data found.    Depression Sreening  PHQ-9 Total Score: 0     ACE III MINI             Assessment/Plan:    Diagnoses and all orders for this visit:    1. Annual physical exam (Primary)    2. Type 2 diabetes mellitus with hyperglycemia, without long-term current use of insulin (CMS/[facility])    3. Attention deficit hyperactivity disorder, predominantly inattentive type  -     amphetamine-dextroamphetamine (Adderall) 10 MG tablet; Take 1 tablet by mouth 2 (Two) Times a Day.   Dispense: 60 tablet; Refill: 0    4. Idiopathic chronic gout without tophus, unspecified site    5. Mixed hyperlipidemia    6. Seasonal allergic rhinitis due to pollen    7. Mild intermittent asthma without complication    8. Screen for colon cancer  -     Ambulatory Referral to Colorectal Surgery    Other orders  -     fluticasone-salmeterol (Advair Diskus) 100-50 MCG/DOSE DISKUS; Inhale 1 puff 2 (Two) Times a Day.  Dispense: 60 each; Refill: 5    Plan  Overall he is doing very well.  Once again I applauded his weight loss.  He did not get colonoscopy done last year, this is scheduled.      A1c remains within the normal range at 5.5 on  Dietary therapy alone.  Foot exam was performed today.    ADD remains well compensated on current dose of Adderall without evidence of tolerance or misuse.    Gout is asymptomatic.    He is beginning to experience some mild asthma symptoms.  He is given samples of Breztri and will use that or Advair.              Labs  Results for orders placed or performed in visit on 12/17/20   PSA Screen    Specimen: Blood    BLOOD   Result Value Ref Range    PSA 0.196 0.000 - 4.000 ng/mL   Lipid Panel    Specimen: Blood    BLOOD   Result Value Ref Range    Total Cholesterol 191 0 - 200 mg/dL    Triglycerides 52 0 - 150 mg/dL    HDL Cholesterol 49 40 - 60 mg/dL    VLDL Cholesterol Karlo 10 5 - 40 mg/dL    LDL Chol Calc (NIH) 132 (H) 0 - 100 mg/dL   Hemoglobin A1c    Specimen: Blood    BLOOD   Result Value Ref Range    Hemoglobin A1C 5.50 4.80 - 5.60 %   Comprehensive Metabolic Panel    Specimen: Blood    BLOOD   Result Value Ref Range    Glucose 106 (H) 65 - 99 mg/dL    BUN 12 8 - 23 mg/dL    Creatinine 0.87 0.76 - 1.27 mg/dL    eGFR Non African Am 88 >60 mL/min/1.73    eGFR African Am 107 >60 mL/min/1.73    BUN/Creatinine Ratio 13.8 7.0 - 25.0    Sodium 140 136 - 145 mmol/L    Potassium 4.8 3.5 - 5.2 mmol/L    Chloride 104 98 - 107 mmol/L    Total CO2 26.2 22.0 - 29.0 mmol/L    Calcium 10.1 8.6 -  10.5 mg/dL    Total Protein 6.7 6.0 - 8.5 g/dL    Albumin 4.50 3.50 - 5.20 g/dL    Globulin 2.2 gm/dL    A/G Ratio 2.0 g/dL    Total Bilirubin 1.1 0.0 - 1.2 mg/dL    Alkaline Phosphatase 79 39 - 117 U/L    AST (SGOT) 20 1 - 40 U/L    ALT (SGPT) 15 1 - 41 U/L   POC Glycosylated Hemoglobin (Hb A1C)    Specimen: Blood   Result Value Ref Range    Hemoglobin A1C 5.3 %   CBC & Differential    BLOOD   Result Value Ref Range    WBC 5.72 3.40 - 10.80 10*3/mm3    RBC 4.95 4.14 - 5.80 10*6/mm3    Hemoglobin 15.1 13.0 - 17.7 g/dL    Hematocrit 43.3 37.5 - 51.0 %    MCV 87.5 79.0 - 97.0 fL    MCH 30.5 26.6 - 33.0 pg    MCHC 34.9 31.5 - 35.7 g/dL    RDW 12.7 12.3 - 15.4 %    Platelets 329 140 - 450 10*3/mm3    Neutrophil Rel % 65.4 42.7 - 76.0 %    Lymphocyte Rel % 18.9 (L) 19.6 - 45.3 %    Monocyte Rel % 9.8 5.0 - 12.0 %    Eosinophil Rel % 4.0 0.3 - 6.2 %    Basophil Rel % 1.7 (H) 0.0 - 1.5 %    Neutrophils Absolute 3.74 1.70 - 7.00 10*3/mm3    Lymphocytes Absolute 1.08 0.70 - 3.10 10*3/mm3    Monocytes Absolute 0.56 0.10 - 0.90 10*3/mm3    Eosinophils Absolute 0.23 0.00 - 0.40 10*3/mm3    Basophils Absolute 0.10 0.00 - 0.20 10*3/mm3    Immature Granulocyte Rel % 0.2 0.0 - 0.5 %    Immature Grans Absolute 0.01 0.00 - 0.05 10*3/mm3    nRBC 0.0 0.0 - 0.2 /100 WBC        Counseling was given to patient for the following topics: appropriate exercise as he is doing, disease prevention and healthy eating habits.    Plan of care reviewed with patient at the conclusion of today's visit. Education was provided regarding diagnosis, management, and any prescribed or recommended OTC medications.Patient verbalizes understanding of and agreement with management plan.         Ridge Hunter MD

## 2021-06-24 ENCOUNTER — OFFICE VISIT (OUTPATIENT)
Dept: INTERNAL MEDICINE | Facility: CLINIC | Age: 66
End: 2021-06-24

## 2021-06-24 VITALS
HEART RATE: 76 BPM | DIASTOLIC BLOOD PRESSURE: 86 MMHG | BODY MASS INDEX: 30.11 KG/M2 | TEMPERATURE: 98.2 F | HEIGHT: 73 IN | WEIGHT: 227.2 LBS | SYSTOLIC BLOOD PRESSURE: 140 MMHG

## 2021-06-24 DIAGNOSIS — E11.43 TYPE 2 DIABETES MELLITUS WITH DIABETIC AUTONOMIC NEUROPATHY, WITHOUT LONG-TERM CURRENT USE OF INSULIN (HCC): Primary | ICD-10-CM

## 2021-06-24 DIAGNOSIS — J45.20 MILD INTERMITTENT ASTHMA WITHOUT COMPLICATION: ICD-10-CM

## 2021-06-24 DIAGNOSIS — F90.0 ATTENTION DEFICIT HYPERACTIVITY DISORDER, PREDOMINANTLY INATTENTIVE TYPE: ICD-10-CM

## 2021-06-24 LAB — HBA1C MFR BLD: 5.7 % (ref 4.8–5.6)

## 2021-06-24 PROCEDURE — 3044F HG A1C LEVEL LT 7.0%: CPT | Performed by: INTERNAL MEDICINE

## 2021-06-24 PROCEDURE — 3051F HG A1C>EQUAL 7.0%<8.0%: CPT | Performed by: INTERNAL MEDICINE

## 2021-06-24 PROCEDURE — 99214 OFFICE O/P EST MOD 30 MIN: CPT | Performed by: INTERNAL MEDICINE

## 2021-06-24 PROCEDURE — 3052F HG A1C>EQUAL 8.0%<EQUAL 9.0%: CPT | Performed by: INTERNAL MEDICINE

## 2021-06-24 PROCEDURE — 83036 HEMOGLOBIN GLYCOSYLATED A1C: CPT | Performed by: INTERNAL MEDICINE

## 2021-06-24 PROCEDURE — 3046F HEMOGLOBIN A1C LEVEL >9.0%: CPT | Performed by: INTERNAL MEDICINE

## 2021-06-24 RX ORDER — DEXTROAMPHETAMINE SACCHARATE, AMPHETAMINE ASPARTATE, DEXTROAMPHETAMINE SULFATE AND AMPHETAMINE SULFATE 2.5; 2.5; 2.5; 2.5 MG/1; MG/1; MG/1; MG/1
10 TABLET ORAL 2 TIMES DAILY
Qty: 60 TABLET | Refills: 0 | Status: SHIPPED | OUTPATIENT
Start: 2021-06-24 | End: 2021-09-03 | Stop reason: SDUPTHER

## 2021-06-24 RX ORDER — ALBUTEROL SULFATE 90 UG/1
AEROSOL, METERED RESPIRATORY (INHALATION)
Qty: 1 G | Refills: 5 | Status: SHIPPED | OUTPATIENT
Start: 2021-06-24 | End: 2022-01-11 | Stop reason: SDUPTHER

## 2021-06-24 NOTE — PROGRESS NOTES
[facility]     Cody Sanchez  1955   [id number]      Patient Care Team:  Rdige Hunter MD as PCP - General  Ridge Hunter MD as PCP - Family Medicine    Chief Complaint::   Chief Complaint   Patient presents with   • Hyperlipidemia   • Diabetes   • ADHD        HPI  Mr. Sanchez comes in for follow-up of his diabetes, ADD and asthma.  He is doing very well.  However he changed jobs and is now working in [place].  His hours are later there and he eats later.  He attributes his weight gain to that.  He states he is otherwise not eating any differently and is getting plenty of steps on the job.  His attention deficit is well compensated his asthma is asymptomatic.    Chronic Conditions:      Patient Active Problem List   Diagnosis   • Annual physical exam   • Gout, unspecified   • Attention deficit hyperactivity disorder, predominantly inattentive type   • Exacerbation of asthma   • Obesity   • Allergic rhinitis   • Mixed hyperlipidemia   • Type 2 diabetes mellitus with diabetic autonomic neuropathy, without long-term current use of insulin (CMS/[facility])   • Mild intermittent asthma without complication        Past Medical History:   Diagnosis Date   • Deep vein thrombosis (CMS/[facility])        Past Surgical History:   Procedure Laterality Date   • TONSILLECTOMY         Family History   Problem Relation Age of Onset   • Hypertension Mother    • Coronary artery disease Mother    • Hypertension Father    • Coronary artery disease Father    • Cancer Brother         PROSTATE CANCER        Social History     Socioeconomic History   • Marital status:      Spouse name: Not on file   • Number of children: Not on file   • Years of education: Not on file   • Highest education level: Not on file   Tobacco Use   • Smoking status: Never Smoker   • Smokeless tobacco: Never Used   Substance and Sexual Activity   • Alcohol use: No   • Drug use: No       No Known Allergies      Current Outpatient  "Medications:   •  albuterol sulfate  (90 Base) MCG/ACT inhaler, Inhale 2 puffs by inhalation route every 4-6 hours as needed, Disp: 1 g, Rfl: 5  •  B Complex Vitamins (VITAMIN-B COMPLEX PO), Take 1 tablet by mouth Daily., Disp: , Rfl:   •  BIOTIN PO, Take 1 tablet by mouth Every Other Day., Disp: , Rfl:   •  Budeson-Glycopyrrol-Formoterol (Breztri Aerosphere) 160-9-4.8 MCG/ACT aerosol inhaler, Inhale 2 puffs 2 (Two) Times a Day., Disp: 4 each, Rfl: 0  •  Cholecalciferol (VITAMIN D3) 1000 units capsule, Take 1 capsule by mouth Every Other Day., Disp: , Rfl:   •  Fiber Complete tablet, Take 2 tablets by mouth 2 (two) times a day., Disp: , Rfl:   •  fluticasone-salmeterol (Advair Diskus) 100-50 MCG/DOSE DISKUS, Inhale 1 puff 2 (Two) Times a Day., Disp: 60 each, Rfl: 5  •  Magnesium Gluconate 550 MG tablet, Take 1 tablet by mouth Every Other Day., Disp: , Rfl:   •  MULTIPLE VITAMIN PO, Take 1 tablet by mouth Daily., Disp: , Rfl:   •  VITAMIN A PO, Take 1 tablet by mouth Daily., Disp: , Rfl:   •  vitamin C (ASCORBIC ACID) 500 MG tablet, Take 1 tablet by mouth Daily., Disp: , Rfl:   •  VITAMIN E COMPLEX PO, Take 1 tablet by mouth Every Other Day., Disp: , Rfl:   •  Zinc 50 MG tablet, Take 1 tablet by mouth Every Other Day., Disp: , Rfl:   •  amphetamine-dextroamphetamine (Adderall) 10 MG tablet, Take 1 tablet by mouth 2 (Two) Times a Day., Disp: 60 tablet, Rfl: 0    Review of Systems   Constitutional: Negative.    Respiratory: Negative.  Negative for chest tightness and shortness of breath.    Cardiovascular: Negative.  Negative for chest pain.   Gastrointestinal: Negative for abdominal pain, blood in stool, constipation and diarrhea.        Vital Signs  Vitals:    06/24/21 0842   BP: 140/86   BP Location: Right arm   Patient Position: Sitting   Cuff Size: Adult   Pulse: 76   Temp: 98.2 °F (36.8 °C)   Weight: 103 kg (227 lb 3.2 oz)   Height: 184.2 cm (72.52\")   PainSc:   3   PainLoc: Leg  Comment: right "       Physical Exam  Vitals reviewed.   Constitutional:       Appearance: He is well-developed.   HENT:      Head: Normocephalic and atraumatic.   Cardiovascular:      Rate and Rhythm: Normal rate and regular rhythm.      Heart sounds: Normal heart sounds. No murmur heard.     Pulmonary:      Effort: Pulmonary effort is normal.      Breath sounds: Normal breath sounds.   Neurological:      Mental Status: He is alert and oriented to person, place, and time.          Procedures    ACE III MINI             Assessment/Plan:    Diagnoses and all orders for this visit:    1. Type 2 diabetes mellitus with diabetic autonomic neuropathy, without long-term current use of insulin (CMS/[facility]) (Primary)  -     POC Glycosylated Hemoglobin (Hb A1C)    2. Attention deficit hyperactivity disorder, predominantly inattentive type  -     amphetamine-dextroamphetamine (Adderall) 10 MG tablet; Take 1 tablet by mouth 2 (Two) Times a Day.  Dispense: 60 tablet; Refill: 0    3. Mild intermittent asthma without complication    Other orders  -     albuterol sulfate  (90 Base) MCG/ACT inhaler; Inhale 2 puffs by inhalation route every 4-6 hours as needed  Dispense: 1 g; Refill: 5    Plan    A1c remains well controlled at 5.7.  We discussed strategies on diet and weight loss.    Attention deficit remains well compensated on Adderall without evidence of tolerance or misuse.    Asthma is currently asymptomatic on Advair.  He currently takes this as needed.    Patient's Body mass index is 30.37 kg/m². indicating that he is obese (BMI >30). Obesity-related health conditions include the following: diabetes mellitus and dyslipidemias. Obesity is worsening. BMI is is above average; BMI management plan is completed. We discussed portion control, increasing exercise and joining a fitness center or start home based exercise program..        Plan of care reviewed with patient at the conclusion of today's visit. Education was provided regarding  diagnosis, management, and any prescribed or recommended OTC medications.Patient verbalizes understanding of and agreement with management plan.         Ridge Hunter MD

## 2021-09-03 DIAGNOSIS — F90.0 ATTENTION DEFICIT HYPERACTIVITY DISORDER, PREDOMINANTLY INATTENTIVE TYPE: ICD-10-CM

## 2021-09-03 RX ORDER — DEXTROAMPHETAMINE SACCHARATE, AMPHETAMINE ASPARTATE, DEXTROAMPHETAMINE SULFATE AND AMPHETAMINE SULFATE 2.5; 2.5; 2.5; 2.5 MG/1; MG/1; MG/1; MG/1
10 TABLET ORAL 2 TIMES DAILY
Qty: 60 TABLET | Refills: 0 | Status: SHIPPED | OUTPATIENT
Start: 2021-09-03 | End: 2021-10-05 | Stop reason: SDUPTHER

## 2021-09-03 NOTE — TELEPHONE ENCOUNTER
Rx Refill Note  Requested Prescriptions     Pending Prescriptions Disp Refills   • amphetamine-dextroamphetamine (Adderall) 10 MG tablet 60 tablet 0     Sig: Take 1 tablet by mouth 2 (Two) Times a Day.      Last office visit with prescribing clinician: 6/24/2021      Next office visit with prescribing clinician: 10/5/2021   LA: 06/24/21 #60 0r             Sarah Mathew LPN  09/03/21, 13:09 EDT

## 2021-09-03 NOTE — TELEPHONE ENCOUNTER
Caller: Cody Sanchez    Relationship: Self    Best call back number: [phone]    Medication needed:   Requested Prescriptions     Pending Prescriptions Disp Refills   • amphetamine-dextroamphetamine (Adderall) 10 MG tablet 60 tablet 0     Sig: Take 1 tablet by mouth 2 (Two) Times a Day.       When do you need the refill by: 09/03    What additional details did the patient provide when requesting the medication: PATIENT IS OUT OF THE MEDICATION     Does the patient have less than a 3 day supply:  [x] Yes  [] No    What is the patient's preferred pharmacy: [id number] - [place], [place] - [address] [facility] [address] - [phone]  - [phone] FX

## 2021-10-05 ENCOUNTER — OFFICE VISIT (OUTPATIENT)
Dept: INTERNAL MEDICINE | Facility: CLINIC | Age: 66
End: 2021-10-05

## 2021-10-05 ENCOUNTER — LAB (OUTPATIENT)
Dept: LAB | Facility: HOSPITAL | Age: 66
End: 2021-10-05

## 2021-10-05 VITALS
DIASTOLIC BLOOD PRESSURE: 84 MMHG | HEART RATE: 76 BPM | TEMPERATURE: 98.4 F | BODY MASS INDEX: 28.07 KG/M2 | WEIGHT: 211.8 LBS | SYSTOLIC BLOOD PRESSURE: 120 MMHG | HEIGHT: 73 IN

## 2021-10-05 DIAGNOSIS — J45.20 MILD INTERMITTENT ASTHMA WITHOUT COMPLICATION: ICD-10-CM

## 2021-10-05 DIAGNOSIS — E11.43 TYPE 2 DIABETES MELLITUS WITH DIABETIC AUTONOMIC NEUROPATHY, WITHOUT LONG-TERM CURRENT USE OF INSULIN (HCC): ICD-10-CM

## 2021-10-05 DIAGNOSIS — Z11.59 ENCOUNTER FOR HEPATITIS C SCREENING TEST FOR LOW RISK PATIENT: ICD-10-CM

## 2021-10-05 DIAGNOSIS — E78.2 MIXED HYPERLIPIDEMIA: ICD-10-CM

## 2021-10-05 DIAGNOSIS — F90.0 ATTENTION DEFICIT HYPERACTIVITY DISORDER, PREDOMINANTLY INATTENTIVE TYPE: ICD-10-CM

## 2021-10-05 DIAGNOSIS — E11.43 TYPE 2 DIABETES MELLITUS WITH DIABETIC AUTONOMIC NEUROPATHY, WITHOUT LONG-TERM CURRENT USE OF INSULIN (HCC): Primary | ICD-10-CM

## 2021-10-05 LAB
ALBUMIN SERPL-MCNC: 4.8 G/DL (ref 3.5–5.2)
ALBUMIN/GLOB SERPL: 1.9 G/DL
ALP SERPL-CCNC: 69 U/L (ref 39–117)
ALT SERPL W P-5'-P-CCNC: 13 U/L (ref 1–41)
ANION GAP SERPL CALCULATED.3IONS-SCNC: 11.6 MMOL/L (ref 5–15)
AST SERPL-CCNC: 20 U/L (ref 1–40)
BILIRUB SERPL-MCNC: 1 MG/DL (ref 0–1.2)
BUN SERPL-MCNC: 14 MG/DL (ref 8–23)
BUN/CREAT SERPL: 15.9 (ref 7–25)
CALCIUM SPEC-SCNC: 10 MG/DL (ref 8.6–10.5)
CHLORIDE SERPL-SCNC: 102 MMOL/L (ref 98–107)
CHOLEST SERPL-MCNC: 219 MG/DL (ref 0–200)
CO2 SERPL-SCNC: 26.4 MMOL/L (ref 22–29)
CREAT SERPL-MCNC: 0.88 MG/DL (ref 0.76–1.27)
GFR SERPL CREATININE-BSD FRML MDRD: 87 ML/MIN/1.73
GLOBULIN UR ELPH-MCNC: 2.5 GM/DL
GLUCOSE SERPL-MCNC: 101 MG/DL (ref 65–99)
HBA1C MFR BLD: 5.1 % (ref 4.8–5.6)
HDLC SERPL-MCNC: 51 MG/DL (ref 40–60)
LDLC SERPL CALC-MCNC: 158 MG/DL (ref 0–100)
LDLC/HDLC SERPL: 3.07 {RATIO}
POTASSIUM SERPL-SCNC: 4.5 MMOL/L (ref 3.5–5.2)
PROT SERPL-MCNC: 7.3 G/DL (ref 6–8.5)
SODIUM SERPL-SCNC: 140 MMOL/L (ref 136–145)
TRIGL SERPL-MCNC: 56 MG/DL (ref 0–150)
VLDLC SERPL-MCNC: 10 MG/DL (ref 5–40)

## 2021-10-05 PROCEDURE — 90662 IIV NO PRSV INCREASED AG IM: CPT | Performed by: INTERNAL MEDICINE

## 2021-10-05 PROCEDURE — 99214 OFFICE O/P EST MOD 30 MIN: CPT | Performed by: INTERNAL MEDICINE

## 2021-10-05 PROCEDURE — 83036 HEMOGLOBIN GLYCOSYLATED A1C: CPT

## 2021-10-05 PROCEDURE — 86803 HEPATITIS C AB TEST: CPT

## 2021-10-05 PROCEDURE — 80053 COMPREHEN METABOLIC PANEL: CPT

## 2021-10-05 PROCEDURE — G0008 ADMIN INFLUENZA VIRUS VAC: HCPCS | Performed by: INTERNAL MEDICINE

## 2021-10-05 PROCEDURE — 80061 LIPID PANEL: CPT

## 2021-10-05 RX ORDER — DEXTROAMPHETAMINE SACCHARATE, AMPHETAMINE ASPARTATE, DEXTROAMPHETAMINE SULFATE AND AMPHETAMINE SULFATE 2.5; 2.5; 2.5; 2.5 MG/1; MG/1; MG/1; MG/1
10 TABLET ORAL 2 TIMES DAILY
Qty: 60 TABLET | Refills: 0 | Status: SHIPPED | OUTPATIENT
Start: 2021-10-05 | End: 2021-12-01 | Stop reason: SDUPTHER

## 2021-10-05 NOTE — PROGRESS NOTES
[facility]     Cody Sanchez  1955   [id number]      Patient Care Team:  Ridge Hunter MD as PCP - General  Ridge Hunter MD as PCP - Family Medicine    Chief Complaint::   Chief Complaint   Patient presents with   • Hyperlipidemia   • Diabetes   • ADHD   • Asthma        HPI  Mr. Sanchez is now 65.  He comes in for follow-up of his diabetes, hyperlipidemia, asthma and ADD.  Last visit because of a job change and irregular eating schedule he had gained weight back.  However he is corrected that it is now back to his lowest weight.  He feels better.  He continues to have a good response from Adderall with good focus and attention.  Asthma is asymptomatic.    Chronic Conditions:      Patient Active Problem List   Diagnosis   • Annual physical exam   • Gout, unspecified   • Attention deficit hyperactivity disorder, predominantly inattentive type   • Exacerbation of asthma   • Obesity   • Allergic rhinitis   • Mixed hyperlipidemia   • Type 2 diabetes mellitus with diabetic autonomic neuropathy, without long-term current use of insulin (HCC)   • Mild intermittent asthma without complication        Past Medical History:   Diagnosis Date   • Deep vein thrombosis (HCC)        Past Surgical History:   Procedure Laterality Date   • TONSILLECTOMY         Family History   Problem Relation Age of Onset   • Hypertension Mother    • Coronary artery disease Mother    • Hypertension Father    • Coronary artery disease Father    • Cancer Brother         PROSTATE CANCER        Social History     Socioeconomic History   • Marital status:      Spouse name: Not on file   • Number of children: Not on file   • Years of education: Not on file   • Highest education level: Not on file   Tobacco Use   • Smoking status: Never Smoker   • Smokeless tobacco: Never Used   Substance and Sexual Activity   • Alcohol use: No   • Drug use: No       No Known Allergies      Current Outpatient Medications:  "  •  albuterol sulfate  (90 Base) MCG/ACT inhaler, Inhale 2 puffs by inhalation route every 4-6 hours as needed, Disp: 1 g, Rfl: 5  •  amphetamine-dextroamphetamine (Adderall) 10 MG tablet, Take 1 tablet by mouth 2 (Two) Times a Day., Disp: 60 tablet, Rfl: 0  •  B Complex Vitamins (VITAMIN-B COMPLEX PO), Take 1 tablet by mouth Daily., Disp: , Rfl:   •  BIOTIN PO, Take 1 tablet by mouth Every Other Day., Disp: , Rfl:   •  Cholecalciferol (VITAMIN D3) 1000 units capsule, Take 1 capsule by mouth Every Other Day., Disp: , Rfl:   •  Fiber Complete tablet, Take 2 tablets by mouth 2 (two) times a day., Disp: , Rfl:   •  Magnesium Gluconate 550 MG tablet, Take 1 tablet by mouth Every Other Day., Disp: , Rfl:   •  MULTIPLE VITAMIN PO, Take 1 tablet by mouth Daily., Disp: , Rfl:   •  VITAMIN A PO, Take 1 tablet by mouth Daily., Disp: , Rfl:   •  vitamin C (ASCORBIC ACID) 500 MG tablet, Take 1 tablet by mouth Daily., Disp: , Rfl:   •  VITAMIN E COMPLEX PO, Take 1 tablet by mouth Every Other Day., Disp: , Rfl:   •  Zinc 50 MG tablet, Take 1 tablet by mouth Every Other Day., Disp: , Rfl:   •  Budeson-Glycopyrrol-Formoterol (Breztri Aerosphere) 160-9-4.8 MCG/ACT aerosol inhaler, Inhale 2 puffs 2 (Two) Times a Day., Disp: 4 each, Rfl: 0  •  fluticasone-salmeterol (Advair Diskus) 100-50 MCG/DOSE DISKUS, Inhale 1 puff 2 (Two) Times a Day., Disp: 60 each, Rfl: 5    Review of Systems   Constitutional: Negative.    Respiratory: Negative.  Negative for chest tightness and shortness of breath.    Cardiovascular: Negative.  Negative for chest pain.   Gastrointestinal: Negative for abdominal pain, blood in stool, constipation and diarrhea.        Vital Signs  Vitals:    10/05/21 1021   BP: 120/84   BP Location: Left arm   Patient Position: Sitting   Cuff Size: Large Adult   Pulse: 76   Temp: 98.4 °F (36.9 °C)   Weight: 96.1 kg (211 lb 12.8 oz)   Height: 184.2 cm (72.52\")   PainSc: 0-No pain       Physical Exam  Vitals reviewed. "   Constitutional:       Appearance: He is well-developed.   HENT:      Head: Normocephalic and atraumatic.   Cardiovascular:      Rate and Rhythm: Normal rate and regular rhythm.      Heart sounds: Normal heart sounds. No murmur heard.     Pulmonary:      Effort: Pulmonary effort is normal.      Breath sounds: Normal breath sounds.   Neurological:      Mental Status: He is alert and oriented to person, place, and time.          Procedures    ACE III MINI             Assessment/Plan:    Diagnoses and all orders for this visit:    1. Type 2 diabetes mellitus with diabetic autonomic neuropathy, without long-term current use of insulin (HCC) (Primary)  -     Hemoglobin A1c; Future    2. Mixed hyperlipidemia  -     Comprehensive Metabolic Panel; Future  -     Lipid Panel; Future    3. Encounter for hepatitis C screening test for low risk patient  -     Hepatitis C Antibody; Future    4. Attention deficit hyperactivity disorder, predominantly inattentive type  -     amphetamine-dextroamphetamine (Adderall) 10 MG tablet; Take 1 tablet by mouth 2 (Two) Times a Day.  Dispense: 60 tablet; Refill: 0    5. Mild intermittent asthma without complication    Other orders  -     Fluzone High-Dose 65+yrs    Plan    A1c is pending, I applauded his weight loss.  He will continue healthy diet and regular exercise.    Lipid panel is pending, but.  Remains diet and exercise.    Attention deficit disorder is well controlled on Adderall.    Asthma is asymptomatic, currently using only rescue inhaler as needed.    Patient's Body mass index is 28.31 kg/m². indicating that he is overweight (BMI 25-29.9). Obesity-related health conditions include the following: diabetes mellitus and dyslipidemias. Obesity is improving with lifestyle modifications. BMI is is above average; BMI management plan is completed. We discussed portion control, increasing exercise and joining a fitness center or start home based exercise program..        Plan of care  reviewed with patient at the conclusion of today's visit. Education was provided regarding diagnosis, management, and any prescribed or recommended OTC medications.Patient verbalizes understanding of and agreement with management plan.         Ridge Hunter MD

## 2021-10-06 LAB — HCV AB SER DONR QL: NORMAL

## 2021-12-01 DIAGNOSIS — F90.0 ATTENTION DEFICIT HYPERACTIVITY DISORDER, PREDOMINANTLY INATTENTIVE TYPE: ICD-10-CM

## 2021-12-01 RX ORDER — DEXTROAMPHETAMINE SACCHARATE, AMPHETAMINE ASPARTATE, DEXTROAMPHETAMINE SULFATE AND AMPHETAMINE SULFATE 2.5; 2.5; 2.5; 2.5 MG/1; MG/1; MG/1; MG/1
10 TABLET ORAL 2 TIMES DAILY
Qty: 60 TABLET | Refills: 0 | Status: SHIPPED | OUTPATIENT
Start: 2021-12-01 | End: 2022-01-11 | Stop reason: SDUPTHER

## 2021-12-01 NOTE — TELEPHONE ENCOUNTER
Caller: Cody Sanchez    Relationship: Self    Best call back number:  [phone]    Requested Prescriptions:   Requested Prescriptions     Pending Prescriptions Disp Refills   • amphetamine-dextroamphetamine (Adderall) 10 MG tablet 60 tablet 0     Sig: Take 1 tablet by mouth 2 (Two) Times a Day.        Pharmacy where request should be sent: [facility] [id number] [place] [address] [place] [address] - [phone]  - [phone] FX     Additional details provided by patient:  PATIENT HAS 1 PILL LEFT.      Does the patient have less than a 3 day supply:  [x] Yes  [] No    Frances Carnes   12/01/21 13:12 EST

## 2021-12-01 NOTE — TELEPHONE ENCOUNTER
Rx Refill Note  Requested Prescriptions     Pending Prescriptions Disp Refills   • amphetamine-dextroamphetamine (Adderall) 10 MG tablet 60 tablet 0     Sig: Take 1 tablet by mouth 2 (Two) Times a Day.     Last refill:   10/5/21 #60/0  Last office visit with prescribing clinician: 10/5/2021      Next office visit with prescribing clinician: 1/11/2022            Narda Drake RN  12/01/21, 13:24 EST

## 2022-01-11 ENCOUNTER — OFFICE VISIT (OUTPATIENT)
Dept: INTERNAL MEDICINE | Facility: CLINIC | Age: 67
End: 2022-01-11

## 2022-01-11 VITALS
TEMPERATURE: 97.8 F | HEART RATE: 72 BPM | DIASTOLIC BLOOD PRESSURE: 78 MMHG | WEIGHT: 224 LBS | HEIGHT: 73 IN | BODY MASS INDEX: 29.69 KG/M2 | SYSTOLIC BLOOD PRESSURE: 120 MMHG

## 2022-01-11 DIAGNOSIS — F90.0 ATTENTION DEFICIT HYPERACTIVITY DISORDER, PREDOMINANTLY INATTENTIVE TYPE: ICD-10-CM

## 2022-01-11 DIAGNOSIS — J45.20 MILD INTERMITTENT ASTHMA WITHOUT COMPLICATION: ICD-10-CM

## 2022-01-11 DIAGNOSIS — E11.65 TYPE 2 DIABETES MELLITUS WITH HYPERGLYCEMIA, WITHOUT LONG-TERM CURRENT USE OF INSULIN: Primary | ICD-10-CM

## 2022-01-11 LAB
EXPIRATION DATE: NORMAL
HBA1C MFR BLD: 5.4 %
Lab: NORMAL

## 2022-01-11 PROCEDURE — 99214 OFFICE O/P EST MOD 30 MIN: CPT | Performed by: INTERNAL MEDICINE

## 2022-01-11 PROCEDURE — 3044F HG A1C LEVEL LT 7.0%: CPT | Performed by: INTERNAL MEDICINE

## 2022-01-11 PROCEDURE — 83036 HEMOGLOBIN GLYCOSYLATED A1C: CPT | Performed by: INTERNAL MEDICINE

## 2022-01-11 RX ORDER — DEXTROAMPHETAMINE SACCHARATE, AMPHETAMINE ASPARTATE, DEXTROAMPHETAMINE SULFATE AND AMPHETAMINE SULFATE 2.5; 2.5; 2.5; 2.5 MG/1; MG/1; MG/1; MG/1
10 TABLET ORAL 2 TIMES DAILY
Qty: 60 TABLET | Refills: 0 | Status: SHIPPED | OUTPATIENT
Start: 2022-01-11 | End: 2022-04-19 | Stop reason: SDUPTHER

## 2022-01-11 RX ORDER — ALBUTEROL SULFATE 90 UG/1
AEROSOL, METERED RESPIRATORY (INHALATION)
Qty: 1 G | Refills: 5 | Status: SHIPPED | OUTPATIENT
Start: 2022-01-11 | End: 2023-04-05 | Stop reason: SDUPTHER

## 2022-01-11 NOTE — ASSESSMENT & PLAN NOTE
- I dispensed samples of Breo Ellipta.   - He will use 1 inhalation daily.  - He will follow up in 3 months.

## 2022-01-11 NOTE — ASSESSMENT & PLAN NOTE
- Hemoglobin A1c is well controlled at 5.4 percent.  - He will continue to do an excellent job with diet and weight control.

## 2022-01-11 NOTE — ASSESSMENT & PLAN NOTE
- He will continue 10 mg of Adderall once daily. This seems to provide good focus and attention.  - There is no evidence of tolerance or misuse.

## 2022-01-11 NOTE — PROGRESS NOTES
[facility]     Cody Sanchez  1955   [id number]      Patient Care Team:  Ridge Hunter MD as PCP - General  Ridge Hunter MD as PCP - Family Medicine    Chief Complaint::   Chief Complaint   Patient presents with   • ADHD   • Diabetes   • Hyperlipidemia        HPI  The patient has consented to being recorded using [employer]OS[employer].    Diabetes  The patient reports that overall, he feels well. He gained weight over the holidays.     Attention deficit disorder  The patient feels as though the 10 mg of Adderall is working well for him. He notes an improvement in his focus and attention. There are times when he feels he may need a higher dose. At this time, he is content to continue the 10 mg of Adderall.     Asthma  He has not been able to afford his maintenance inhaler. He complains of breakthrough wheezing.       Chronic Conditions:      Patient Active Problem List   Diagnosis   • Annual physical exam   • Gout, unspecified   • Attention deficit hyperactivity disorder, predominantly inattentive type   • Exacerbation of asthma   • Obesity   • Allergic rhinitis   • Mixed hyperlipidemia   • Type 2 diabetes mellitus with hyperglycemia, without long-term current use of insulin (HCC)   • Mild intermittent asthma without complication        Past Medical History:   Diagnosis Date   • Deep vein thrombosis (HCC)        Past Surgical History:   Procedure Laterality Date   • TONSILLECTOMY         Family History   Problem Relation Age of Onset   • Hypertension Mother    • Coronary artery disease Mother    • Hypertension Father    • Coronary artery disease Father    • Cancer Brother         PROSTATE CANCER        Social History     Socioeconomic History   • Marital status:    Tobacco Use   • Smoking status: Never Smoker   • Smokeless tobacco: Never Used   Substance and Sexual Activity   • Alcohol use: No   • Drug use: No       No Known Allergies      Current Outpatient Medications:   •   albuterol sulfate  (90 Base) MCG/ACT inhaler, Inhale 2 puffs by inhalation route every 4-6 hours as needed, Disp: 1 g, Rfl: 5  •  amphetamine-dextroamphetamine (Adderall) 10 MG tablet, Take 1 tablet by mouth 2 (Two) Times a Day., Disp: 60 tablet, Rfl: 0  •  B Complex Vitamins (VITAMIN-B COMPLEX PO), Take 1 tablet by mouth Daily., Disp: , Rfl:   •  BIOTIN PO, Take 1 tablet by mouth Every Other Day., Disp: , Rfl:   •  Cholecalciferol (VITAMIN D3) 1000 units capsule, Take 1 capsule by mouth Every Other Day., Disp: , Rfl:   •  Magnesium Gluconate 550 MG tablet, Take 1 tablet by mouth Every Other Day., Disp: , Rfl:   •  MULTIPLE VITAMIN PO, Take 1 tablet by mouth Daily., Disp: , Rfl:   •  VITAMIN A PO, Take 1 tablet by mouth Daily., Disp: , Rfl:   •  vitamin C (ASCORBIC ACID) 500 MG tablet, Take 1 tablet by mouth Daily., Disp: , Rfl:   •  VITAMIN E COMPLEX PO, Take 1 tablet by mouth Every Other Day., Disp: , Rfl:   •  Zinc 50 MG tablet, Take 1 tablet by mouth Every Other Day., Disp: , Rfl:   •  Fluticasone Furoate-Vilanterol (Breo Ellipta) 200-25 MCG/INH inhaler, Inhale 1 puff Daily., Disp: 2 each, Rfl: 0    Review of Systems   Constitutional: Negative for chills, fatigue and fever.   HENT: Negative for congestion, ear pain and sinus pressure.    Respiratory: Negative for cough, chest tightness, shortness of breath and wheezing.    Cardiovascular: Negative for chest pain and palpitations.   Gastrointestinal: Negative for abdominal pain, blood in stool and constipation.   Skin: Negative for color change.   Allergic/Immunologic: Negative for environmental allergies.   Neurological: Negative for dizziness, speech difficulty and headache.   Psychiatric/Behavioral: Negative for decreased concentration. The patient is not nervous/anxious.         Vital Signs  Vitals:    01/11/22 0819   BP: 120/78   BP Location: Left arm   Patient Position: Sitting   Cuff Size: Large Adult   Pulse: 72   Temp: 97.8 °F (36.6 °C)   Weight:  "102 kg (224 lb)   Height: 184.2 cm (72.52\")   PainSc: 0-No pain       Physical Exam  Vitals reviewed.   Constitutional:       Appearance: He is well-developed.   HENT:      Head: Normocephalic and atraumatic.   Cardiovascular:      Rate and Rhythm: Normal rate and regular rhythm.      Heart sounds: Normal heart sounds. No murmur heard.      Pulmonary:      Effort: Pulmonary effort is normal.      Breath sounds: Wheezing (Mild diffuse wheezing on expiration. ) present.   Neurological:      Mental Status: He is alert and oriented to person, place, and time.          Procedures    ACE III MINI             Assessment/Plan:    Diagnoses and all orders for this visit:    1. Type 2 diabetes mellitus with hyperglycemia, without long-term current use of insulin (HCC) (Primary)  Assessment & Plan:  - Hemoglobin A1c is well controlled at 5.4 percent.  - He will continue to do an excellent job with diet and weight control.      Orders:  -     POC Glycosylated Hemoglobin (Hb A1C)    2. Attention deficit hyperactivity disorder, predominantly inattentive type  Assessment & Plan:  - He will continue 10 mg of Adderall once daily. This seems to provide good focus and attention.  - There is no evidence of tolerance or misuse.    Orders:  -     amphetamine-dextroamphetamine (Adderall) 10 MG tablet; Take 1 tablet by mouth 2 (Two) Times a Day.  Dispense: 60 tablet; Refill: 0    3. Mild intermittent asthma without complication  Assessment & Plan:  - I dispensed samples of Breo Ellipta.   - He will use 1 inhalation daily.  - He will follow up in 3 months.          Other orders  -     albuterol sulfate  (90 Base) MCG/ACT inhaler; Inhale 2 puffs by inhalation route every 4-6 hours as needed  Dispense: 1 g; Refill: 5  -     Fluticasone Furoate-Vilanterol (Breo Ellipta) 200-25 MCG/INH inhaler; Inhale 1 puff Daily.  Dispense: 2 each; Refill: 0      At his next visit, we will obtain fasting labs.    Plan of care reviewed with patient at " the conclusion of today's visit. Education was provided regarding diagnosis, management, and any prescribed or recommended OTC medications.Patient verbalizes understanding of and agreement with management plan.         Ridge Hunter MD           Answers for HPI/ROS submitted by the patient on 1/11/2022  What is the primary reason for your visit?: Other  Please describe your symptoms.: Not certain. May be regularly set quarterly review appt. My appt card has 01/Feb as appt date. [facility] now list appts every month; Jan, Feb & March. Not sure why. Current symptoms are sore throat from sinus drainage at night.  Have you had these symptoms before?: Yes  How long have you been having these symptoms?: 5-7 days  Please list any medications you are currently taking for this condition.: N/A  Please describe any probable cause for these symptoms. : Allergy reaction to rapidly fluctuating weather coupled w/other allergic reactions (dust, pollen, pet dander, etc.).      Transcribed from ambient dictation for Ridge Hunter MD by Briana Ruiz  01/11/22   10:36 EST    Patient verbalized consent to the visit recording.

## 2022-04-19 ENCOUNTER — LAB (OUTPATIENT)
Dept: LAB | Facility: HOSPITAL | Age: 67
End: 2022-04-19

## 2022-04-19 ENCOUNTER — OFFICE VISIT (OUTPATIENT)
Dept: INTERNAL MEDICINE | Facility: CLINIC | Age: 67
End: 2022-04-19

## 2022-04-19 VITALS
TEMPERATURE: 98.2 F | HEIGHT: 73 IN | WEIGHT: 238.2 LBS | DIASTOLIC BLOOD PRESSURE: 80 MMHG | SYSTOLIC BLOOD PRESSURE: 124 MMHG | HEART RATE: 68 BPM | BODY MASS INDEX: 31.57 KG/M2

## 2022-04-19 DIAGNOSIS — E78.2 MIXED HYPERLIPIDEMIA: ICD-10-CM

## 2022-04-19 DIAGNOSIS — F90.0 ATTENTION DEFICIT HYPERACTIVITY DISORDER, PREDOMINANTLY INATTENTIVE TYPE: ICD-10-CM

## 2022-04-19 DIAGNOSIS — E11.65 TYPE 2 DIABETES MELLITUS WITH HYPERGLYCEMIA, WITHOUT LONG-TERM CURRENT USE OF INSULIN: Primary | ICD-10-CM

## 2022-04-19 DIAGNOSIS — E11.65 TYPE 2 DIABETES MELLITUS WITH HYPERGLYCEMIA, WITHOUT LONG-TERM CURRENT USE OF INSULIN: ICD-10-CM

## 2022-04-19 DIAGNOSIS — J45.20 MILD INTERMITTENT ASTHMA WITHOUT COMPLICATION: ICD-10-CM

## 2022-04-19 LAB
ALBUMIN SERPL-MCNC: 4.6 G/DL (ref 3.5–5.2)
ALBUMIN/GLOB SERPL: 1.7 G/DL
ALP SERPL-CCNC: 84 U/L (ref 39–117)
ALT SERPL W P-5'-P-CCNC: 11 U/L (ref 1–41)
ANION GAP SERPL CALCULATED.3IONS-SCNC: 11.6 MMOL/L (ref 5–15)
AST SERPL-CCNC: 14 U/L (ref 1–40)
BILIRUB SERPL-MCNC: 0.4 MG/DL (ref 0–1.2)
BUN SERPL-MCNC: 14 MG/DL (ref 8–23)
BUN/CREAT SERPL: 16.1 (ref 7–25)
CALCIUM SPEC-SCNC: 9.6 MG/DL (ref 8.6–10.5)
CHLORIDE SERPL-SCNC: 103 MMOL/L (ref 98–107)
CHOLEST SERPL-MCNC: 200 MG/DL (ref 0–200)
CO2 SERPL-SCNC: 23.4 MMOL/L (ref 22–29)
CREAT SERPL-MCNC: 0.87 MG/DL (ref 0.76–1.27)
EGFRCR SERPLBLD CKD-EPI 2021: 95.2 ML/MIN/1.73
GLOBULIN UR ELPH-MCNC: 2.7 GM/DL
GLUCOSE SERPL-MCNC: 101 MG/DL (ref 65–99)
HBA1C MFR BLD: 5.7 % (ref 4.8–5.6)
HDLC SERPL-MCNC: 46 MG/DL (ref 40–60)
LDLC SERPL CALC-MCNC: 143 MG/DL (ref 0–100)
LDLC/HDLC SERPL: 3.08 {RATIO}
POTASSIUM SERPL-SCNC: 4.4 MMOL/L (ref 3.5–5.2)
PROT SERPL-MCNC: 7.3 G/DL (ref 6–8.5)
SODIUM SERPL-SCNC: 138 MMOL/L (ref 136–145)
TRIGL SERPL-MCNC: 61 MG/DL (ref 0–150)
VLDLC SERPL-MCNC: 11 MG/DL (ref 5–40)

## 2022-04-19 PROCEDURE — 80053 COMPREHEN METABOLIC PANEL: CPT

## 2022-04-19 PROCEDURE — 80061 LIPID PANEL: CPT

## 2022-04-19 PROCEDURE — 99214 OFFICE O/P EST MOD 30 MIN: CPT | Performed by: INTERNAL MEDICINE

## 2022-04-19 PROCEDURE — 83036 HEMOGLOBIN GLYCOSYLATED A1C: CPT

## 2022-04-19 RX ORDER — DEXTROAMPHETAMINE SACCHARATE, AMPHETAMINE ASPARTATE, DEXTROAMPHETAMINE SULFATE AND AMPHETAMINE SULFATE 2.5; 2.5; 2.5; 2.5 MG/1; MG/1; MG/1; MG/1
10 TABLET ORAL 2 TIMES DAILY
Qty: 60 TABLET | Refills: 0 | Status: SHIPPED | OUTPATIENT
Start: 2022-04-19 | End: 2022-06-28 | Stop reason: SDUPTHER

## 2022-04-19 NOTE — PROGRESS NOTES
[facility]     Cody Sanchez  1955   [id number]      Patient Care Team:  Ridge Hunter MD as PCP - General  Ridge Hunter MD as PCP - Family Medicine    Chief Complaint::   Chief Complaint   Patient presents with   • Hyperlipidemia   • Diabetes   • ADHD        HPI    The patient presents for follow-up of his diabetes, hyperlipidemia, attention deficit disorder, and asthma.    The patient states he is doing well physically.     He is recovering from a fall after his legs were wrapped around her lead approximately 2 months ago. He states the swelling has improved although it is still bothersome with prolonged sitting. He continues to have pain in the right ankle with occasional limping but states it is improving. He has been walking more on the outer edge of his foot but is working on walking normally again.     He is having pain in his right shoulder and thinks he may have damaged his rotator cuff from lifting and loading heavy items. He tries stretching his shoulder in the morning and applies a hot compress.     He has decreased physical activities and blames the weather. He has gained 27 pounds since his last visit approximately 6 months ago. Recently, he has restarted counting his steps and wants to try to lose weight.     Asthma  His asthma is well controlled. He uses a sinus rinse as needed. He was given a Breo Ellipta inhaler sample at his last visit.     Attention deficit disorder  He is tolerating Adderall well and is requesting a refill today.       Chronic Conditions:      Patient Active Problem List   Diagnosis   • Annual physical exam   • Gout, unspecified   • Attention deficit hyperactivity disorder, predominantly inattentive type   • Exacerbation of asthma   • Obesity   • Allergic rhinitis   • Mixed hyperlipidemia   • Type 2 diabetes mellitus with hyperglycemia, without long-term current use of insulin ([facility])   • Mild intermittent asthma without complication         Past Medical History:   Diagnosis Date   • Deep vein thrombosis (HCC)        Past Surgical History:   Procedure Laterality Date   • TONSILLECTOMY         Family History   Problem Relation Age of Onset   • Hypertension Mother    • Coronary artery disease Mother    • Hypertension Father    • Coronary artery disease Father    • Cancer Brother         PROSTATE CANCER        Social History     Socioeconomic History   • Marital status:    Tobacco Use   • Smoking status: Never Smoker   • Smokeless tobacco: Never Used   Substance and Sexual Activity   • Alcohol use: No   • Drug use: No       No Known Allergies      Current Outpatient Medications:   •  albuterol sulfate  (90 Base) MCG/ACT inhaler, Inhale 2 puffs by inhalation route every 4-6 hours as needed, Disp: 1 g, Rfl: 5  •  amphetamine-dextroamphetamine (Adderall) 10 MG tablet, Take 1 tablet by mouth 2 (Two) Times a Day., Disp: 60 tablet, Rfl: 0  •  B Complex Vitamins (VITAMIN-B COMPLEX PO), Take 1 tablet by mouth Daily., Disp: , Rfl:   •  BIOTIN PO, Take 1 tablet by mouth Every Other Day., Disp: , Rfl:   •  Cholecalciferol (VITAMIN D3) 1000 units capsule, Take 1 capsule by mouth Every Other Day., Disp: , Rfl:   •  Magnesium Gluconate 550 MG tablet, Take 1 tablet by mouth Every Other Day., Disp: , Rfl:   •  MULTIPLE VITAMIN PO, Take 1 tablet by mouth Daily., Disp: , Rfl:   •  VITAMIN A PO, Take 1 tablet by mouth Daily., Disp: , Rfl:   •  vitamin C (ASCORBIC ACID) 500 MG tablet, Take 1,000 mg by mouth Daily., Disp: , Rfl:   •  VITAMIN E COMPLEX PO, Take 1 tablet by mouth Every Other Day., Disp: , Rfl:   •  Zinc 50 MG tablet, Take 1 tablet by mouth Every Other Day., Disp: , Rfl:   •  Fluticasone Furoate-Vilanterol (Breo Ellipta) 200-25 MCG/INH inhaler, Inhale 1 puff Daily., Disp: 2 each, Rfl: 0    Review of Systems   Constitutional: Negative for chills, fatigue and fever.   HENT: Negative for congestion, ear pain and sinus pressure.    Respiratory:  "Negative for cough, chest tightness, shortness of breath and wheezing.    Cardiovascular: Negative for chest pain and palpitations.   Gastrointestinal: Negative for abdominal pain, blood in stool and constipation.   Skin: Negative for color change.   Allergic/Immunologic: Negative for environmental allergies.   Neurological: Negative for dizziness, speech difficulty and headache.   Psychiatric/Behavioral: Negative for decreased concentration. The patient is not nervous/anxious.         Vital Signs  Vitals:    04/19/22 0809   BP: 124/80   BP Location: Left arm   Patient Position: Sitting   Cuff Size: Large Adult   Pulse: 68   Temp: 98.2 °F (36.8 °C)   Weight: 108 kg (238 lb 3.2 oz)   Height: 184.2 cm (72.52\")   PainSc:   3   PainLoc: Shoulder  Comment: also ankle       Physical Exam  Vitals reviewed.   Constitutional:       Appearance: He is well-developed.   HENT:      Head: Normocephalic and atraumatic.   Cardiovascular:      Rate and Rhythm: Normal rate and regular rhythm.      Heart sounds: Normal heart sounds. No murmur heard.  Pulmonary:      Effort: Pulmonary effort is normal.      Breath sounds: Normal breath sounds.   Neurological:      Mental Status: He is alert and oriented to person, place, and time.          Procedures    ACE III MINI             Assessment/Plan:    Diagnoses and all orders for this visit:    1. Type 2 diabetes mellitus with hyperglycemia, without long-term current use of insulin ([facility]) (Primary)  -     Comprehensive Metabolic Panel; Future  -     Hemoglobin A1c; Future    2. Mixed hyperlipidemia  -     Lipid Panel; Future    3. Attention deficit hyperactivity disorder, predominantly inattentive type  -     amphetamine-dextroamphetamine (Adderall) 10 MG tablet; Take 1 tablet by mouth 2 (Two) Times a Day.  Dispense: 60 tablet; Refill: 0    4. Mild intermittent asthma without complication      1. Diabetes  - A1c is pending. Unfortunately, he has gained considerable weight over the past 6 " months. In 10/2021, he was 211pounds. Today, he is 238 pounds. We discussed the importance of his efforts at exercise and caloric restriction.    2. Hyperlipidemia  - Lipid panel is pending. The treatment remains healthy diet and weight loss.    3. Attention deficit disorder  - He continues to have a good response to Adderall without evidence of tolerance or misuse.  - I am refilling Adderall today.     4. Asthma  - Asthma currently is asymptomatic, but allergies are flared. He is given samples of Breo and will continue over the counter allergy treatment.    Follow up in 3 months at which time A1c will be done and then full fasting labs in 6 months at his annual.    He is fasting today.     Plan of care reviewed with patient at the conclusion of today's visit. Education was provided regarding diagnosis, management, and any prescribed or recommended OTC medications.Patient verbalizes understanding of and agreement with management plan.         Ridge Hunter MD       Transcribed from ambient dictation for Ridge Hunter MD by Arminda Ovalle  04/19/22   10:17 EDT    Patient verbalized consent to the visit recording.

## 2022-06-28 DIAGNOSIS — F90.0 ATTENTION DEFICIT HYPERACTIVITY DISORDER, PREDOMINANTLY INATTENTIVE TYPE: ICD-10-CM

## 2022-06-28 NOTE — TELEPHONE ENCOUNTER
Rx Refill Note  Requested Prescriptions     Pending Prescriptions Disp Refills   • amphetamine-dextroamphetamine (Adderall) 10 MG tablet 60 tablet 0     Sig: Take 1 tablet by mouth 2 (Two) Times a Day.      Last office visit with prescribing clinician: 4/19/2022      Next office visit with prescribing clinician: 7/19/2022            Guerda Kapoor  06/28/22, 14:45 EDT

## 2022-06-28 NOTE — TELEPHONE ENCOUNTER
Caller: Cody Sanchez    Relationship: Self    Best call back number: [phone]    Requested Prescriptions:   Requested Prescriptions     Pending Prescriptions Disp Refills   • amphetamine-dextroamphetamine (Adderall) 10 MG tablet 60 tablet 0     Sig: Take 1 tablet by mouth 2 (Two) Times a Day.        Pharmacy where request should be sent: [facility] [id number] [place] [address] [address] [place] [address] - [phone]  - [phone] FX     Additional details provided by patient: PATIENT STATES THAT HE IS OUT OF THIS MEDICATION.    Does the patient have less than a 3 day supply:  [x] Yes  [] No    Luis Alberto Monreal Rep   06/28/22 14:40 EDT

## 2022-06-29 RX ORDER — DEXTROAMPHETAMINE SACCHARATE, AMPHETAMINE ASPARTATE, DEXTROAMPHETAMINE SULFATE AND AMPHETAMINE SULFATE 2.5; 2.5; 2.5; 2.5 MG/1; MG/1; MG/1; MG/1
10 TABLET ORAL 2 TIMES DAILY
Qty: 60 TABLET | Refills: 0 | Status: SHIPPED | OUTPATIENT
Start: 2022-06-29 | End: 2022-09-22 | Stop reason: SDUPTHER

## 2022-07-18 ENCOUNTER — TELEPHONE (OUTPATIENT)
Dept: INTERNAL MEDICINE | Facility: CLINIC | Age: 67
End: 2022-07-18

## 2022-07-18 NOTE — TELEPHONE ENCOUNTER
Since we did fasting labs last visit, this time will just do the fingerstick A1c in the office. Does not need to fast for that.

## 2022-07-18 NOTE — TELEPHONE ENCOUNTER
Caller: Cody Sanchez    Relationship to patient: Self    Best call back number: [phone]    Patient is needing: PATIENT CALLING TO SEE IF DR GUAN NEEDS FASTING LABS 07/19/22, IF SO TO PLEASE PLACE ORDER

## 2022-07-19 ENCOUNTER — OFFICE VISIT (OUTPATIENT)
Dept: INTERNAL MEDICINE | Facility: CLINIC | Age: 67
End: 2022-07-19

## 2022-07-19 VITALS
DIASTOLIC BLOOD PRESSURE: 86 MMHG | WEIGHT: 243.6 LBS | BODY MASS INDEX: 32.29 KG/M2 | TEMPERATURE: 98.4 F | HEIGHT: 73 IN | HEART RATE: 72 BPM | SYSTOLIC BLOOD PRESSURE: 118 MMHG

## 2022-07-19 DIAGNOSIS — E11.65 TYPE 2 DIABETES MELLITUS WITH HYPERGLYCEMIA, WITHOUT LONG-TERM CURRENT USE OF INSULIN: Primary | ICD-10-CM

## 2022-07-19 DIAGNOSIS — J45.20 MILD INTERMITTENT ASTHMA WITHOUT COMPLICATION: ICD-10-CM

## 2022-07-19 DIAGNOSIS — F90.0 ATTENTION DEFICIT HYPERACTIVITY DISORDER, PREDOMINANTLY INATTENTIVE TYPE: ICD-10-CM

## 2022-07-19 DIAGNOSIS — J30.1 SEASONAL ALLERGIC RHINITIS DUE TO POLLEN: ICD-10-CM

## 2022-07-19 LAB
EXPIRATION DATE: NORMAL
HBA1C MFR BLD: 5.8 %
Lab: NORMAL

## 2022-07-19 PROCEDURE — 83036 HEMOGLOBIN GLYCOSYLATED A1C: CPT | Performed by: INTERNAL MEDICINE

## 2022-07-19 PROCEDURE — 99214 OFFICE O/P EST MOD 30 MIN: CPT | Performed by: INTERNAL MEDICINE

## 2022-07-19 PROCEDURE — 3044F HG A1C LEVEL LT 7.0%: CPT | Performed by: INTERNAL MEDICINE

## 2022-07-19 NOTE — PROGRESS NOTES
"[facility]     Cody Sanchez  1955   [id number]      Patient Care Team:  Ridge Hunter MD as PCP - General  Ridge Hunter MD as PCP - Family Medicine    Chief Complaint::   Chief Complaint   Patient presents with   • ADHD   • Diabetes   • Hyperlipidemia        HPI  The patient comes in for follow-up of his diabetes, attention deficit disorder, asthma, and allergies.    Left shoulder discomfort  The patient states he is still having issues with his left shoulder, when he positions it in a unnatural position. He states during the day, he has been overactive with his left shoulder. He states he will take 4 over-the-counter ibuprofen during the day. He states he usually sleeps well and he has been trying to learn how to sleep on his right side because he has always slept on his left side. He can raise his left arm up with minimal pain. He states he can occasionally feel his left shoulder \"catch\".     Weight gain  The patient confirms he has gained 20 pounds since 01/2022. He states he is not moving as much as he used to.     Asthma  The patient states his asthma seems to be doing pretty well. He states he finished his Breo Ellipta inhaler when the weather was hot due to the increease in pollen and dust. He would like to have his inhaler renewed. He states he can feel when his asthma symptoms are coming because his chest tightens up. He is doing well on his Adderall, and had it refilled a couple of days ago. He states he is trying to walk more and incorporating salads in his diet.     Chronic Conditions:      Patient Active Problem List   Diagnosis   • Annual physical exam   • Gout, unspecified   • Attention deficit hyperactivity disorder, predominantly inattentive type   • Exacerbation of asthma   • Obesity   • Allergic rhinitis   • Mixed hyperlipidemia   • Type 2 diabetes mellitus with hyperglycemia, without long-term current use of insulin (HCC)   • Mild intermittent asthma " without complication        Past Medical History:   Diagnosis Date   • Deep vein thrombosis (HCC)        Past Surgical History:   Procedure Laterality Date   • TONSILLECTOMY         Family History   Problem Relation Age of Onset   • Hypertension Mother    • Coronary artery disease Mother    • Hypertension Father    • Coronary artery disease Father    • Cancer Brother         PROSTATE CANCER        Social History     Socioeconomic History   • Marital status:    Tobacco Use   • Smoking status: Never Smoker   • Smokeless tobacco: Never Used   Substance and Sexual Activity   • Alcohol use: No   • Drug use: No       No Known Allergies      Current Outpatient Medications:   •  albuterol sulfate  (90 Base) MCG/ACT inhaler, Inhale 2 puffs by inhalation route every 4-6 hours as needed, Disp: 1 g, Rfl: 5  •  amphetamine-dextroamphetamine (Adderall) 10 MG tablet, Take 1 tablet by mouth 2 (Two) Times a Day., Disp: 60 tablet, Rfl: 0  •  B Complex Vitamins (VITAMIN-B COMPLEX PO), Take 1 tablet by mouth Daily., Disp: , Rfl:   •  BIOTIN PO, Take 1 tablet by mouth Every Other Day., Disp: , Rfl:   •  Cholecalciferol (VITAMIN D3) 1000 units capsule, Take 1 capsule by mouth Every Other Day., Disp: , Rfl:   •  Fluticasone Furoate-Vilanterol (Breo Ellipta) 200-25 MCG/INH inhaler, Inhale 1 puff Daily., Disp: 1 each, Rfl: 5  •  Magnesium Gluconate 550 MG tablet, Take 1 tablet by mouth Every Other Day., Disp: , Rfl:   •  MULTIPLE VITAMIN PO, Take 1 tablet by mouth Daily., Disp: , Rfl:   •  VITAMIN A PO, Take 1 tablet by mouth Daily., Disp: , Rfl:   •  vitamin C (ASCORBIC ACID) 500 MG tablet, Take 1,000 mg by mouth Daily., Disp: , Rfl:   •  VITAMIN E COMPLEX PO, Take 1 tablet by mouth Every Other Day., Disp: , Rfl:   •  Zinc 50 MG tablet, Take 1 tablet by mouth Every Other Day., Disp: , Rfl:     Review of Systems   Constitutional: Negative for chills, fatigue and fever.   HENT: Negative for congestion, ear pain and sinus  "pressure.    Respiratory: Negative for cough, chest tightness, shortness of breath and wheezing.    Cardiovascular: Negative for chest pain and palpitations.   Gastrointestinal: Negative for abdominal pain, blood in stool and constipation.   Skin: Negative for color change.   Allergic/Immunologic: Negative for environmental allergies.   Neurological: Negative for dizziness, speech difficulty and headache.   Psychiatric/Behavioral: Negative for decreased concentration. The patient is not nervous/anxious.         Vital Signs  Vitals:    07/19/22 1003   BP: 118/86   BP Location: Left arm   Patient Position: Sitting   Cuff Size: Large Adult   Pulse: 72   Temp: 98.4 °F (36.9 °C)   Weight: 110 kg (243 lb 9.6 oz)   Height: 184.2 cm (72.52\")   PainSc:   4   PainLoc: Shoulder       Physical Exam  Vitals reviewed.   Constitutional:       Appearance: He is well-developed.   HENT:      Head: Normocephalic and atraumatic.   Cardiovascular:      Rate and Rhythm: Normal rate and regular rhythm.      Heart sounds: Normal heart sounds. No murmur heard.  Pulmonary:      Effort: Pulmonary effort is normal.      Breath sounds: Normal breath sounds.   Neurological:      Mental Status: He is alert and oriented to person, place, and time.          Procedures    ACE III MINI             Assessment/Plan:    Diagnoses and all orders for this visit:    1. Type 2 diabetes mellitus with hyperglycemia, without long-term current use of insulin (HCC) (Primary)  -     POC Glycosylated Hemoglobin (Hb A1C)    2. Attention deficit hyperactivity disorder, predominantly inattentive type    3. Mild intermittent asthma without complication    4. Seasonal allergic rhinitis due to pollen    Other orders  -     Fluticasone Furoate-Vilanterol (Breo Ellipta) 200-25 MCG/INH inhaler; Inhale 1 puff Daily.  Dispense: 1 each; Refill: 5        1. Diabetes  - A1c is pending. He has gained substantial weight since the first of the year. The initial treatment is to " improve diet and exercise habits and lose weight.    2. Attention deficit disorder   - He continues to have a good response to Adderall without evidence of tolerance or misuse.    3. Asthma  - Asthma is currently asymptomatic. Continue Breo Ellipta as needed.    4. Seasonal allergies  - He will continue over the counter treatment as needed.    5. Left shoulder discomfort  - This could be early arthritis or a rotator cuff injury. He has good range of motion, but if his symptoms persist, would recommend physical therapy.    Follow up in 3 months.    Plan of care reviewed with patient at the conclusion of today's visit. Education was provided regarding diagnosis, management, and any prescribed or recommended OTC medications.Patient verbalizes understanding of and agreement with management plan.         Ridge Hunter MD         Transcribed from ambient dictation for Ridge Hunter MD by Racheal Palacios.  07/19/22   12:49 EDT    Patient verbalized consent to the visit recording.

## 2022-07-19 NOTE — PROGRESS NOTES
Capillary Blood Specimen Collection  Capillary blood collection performed in left hand by Nasrin Strauss MA. Patient tolerated the procedure well without complications.   07/19/22   Nasrin Strauss MA

## 2022-09-22 DIAGNOSIS — F90.0 ATTENTION DEFICIT HYPERACTIVITY DISORDER, PREDOMINANTLY INATTENTIVE TYPE: ICD-10-CM

## 2022-09-22 RX ORDER — DEXTROAMPHETAMINE SACCHARATE, AMPHETAMINE ASPARTATE, DEXTROAMPHETAMINE SULFATE AND AMPHETAMINE SULFATE 2.5; 2.5; 2.5; 2.5 MG/1; MG/1; MG/1; MG/1
10 TABLET ORAL 2 TIMES DAILY
Qty: 60 TABLET | Refills: 0 | Status: SHIPPED | OUTPATIENT
Start: 2022-09-22 | End: 2022-10-28 | Stop reason: SDUPTHER

## 2022-09-22 NOTE — TELEPHONE ENCOUNTER
Caller: Cody Sanchez    Relationship: Self    Best call back number: [phone]     Requested Prescriptions:   Requested Prescriptions     Pending Prescriptions Disp Refills   • amphetamine-dextroamphetamine (Adderall) 10 MG tablet 60 tablet 0     Sig: Take 1 tablet by mouth 2 (Two) Times a Day.        Pharmacy where request should be sent: [id number] [place] [address] [address] [place] [address] - [phone]  - [phone] FX     Additional details provided by patient:     Does the patient have less than a 3 day supply:  [x] Yes  [] No    Luis Alberto ANGELA Rep   09/22/22 10:06 EDT

## 2022-10-28 ENCOUNTER — OFFICE VISIT (OUTPATIENT)
Dept: INTERNAL MEDICINE | Facility: CLINIC | Age: 67
End: 2022-10-28

## 2022-10-28 ENCOUNTER — LAB (OUTPATIENT)
Dept: LAB | Facility: HOSPITAL | Age: 67
End: 2022-10-28

## 2022-10-28 VITALS
TEMPERATURE: 98 F | WEIGHT: 241.2 LBS | DIASTOLIC BLOOD PRESSURE: 82 MMHG | SYSTOLIC BLOOD PRESSURE: 110 MMHG | HEIGHT: 74 IN | HEART RATE: 72 BPM | BODY MASS INDEX: 30.96 KG/M2

## 2022-10-28 DIAGNOSIS — F90.0 ATTENTION DEFICIT HYPERACTIVITY DISORDER, PREDOMINANTLY INATTENTIVE TYPE: ICD-10-CM

## 2022-10-28 DIAGNOSIS — E78.2 MIXED HYPERLIPIDEMIA: ICD-10-CM

## 2022-10-28 DIAGNOSIS — Z12.11 COLON CANCER SCREENING: ICD-10-CM

## 2022-10-28 DIAGNOSIS — M1A.00X0 IDIOPATHIC CHRONIC GOUT WITHOUT TOPHUS, UNSPECIFIED SITE: ICD-10-CM

## 2022-10-28 DIAGNOSIS — Z00.00 ANNUAL PHYSICAL EXAM: Primary | ICD-10-CM

## 2022-10-28 DIAGNOSIS — Z23 NEEDS FLU SHOT: ICD-10-CM

## 2022-10-28 DIAGNOSIS — E11.65 TYPE 2 DIABETES MELLITUS WITH HYPERGLYCEMIA, WITHOUT LONG-TERM CURRENT USE OF INSULIN: ICD-10-CM

## 2022-10-28 DIAGNOSIS — J30.1 SEASONAL ALLERGIC RHINITIS DUE TO POLLEN: ICD-10-CM

## 2022-10-28 DIAGNOSIS — J45.20 MILD INTERMITTENT ASTHMA WITHOUT COMPLICATION: ICD-10-CM

## 2022-10-28 DIAGNOSIS — Z12.5 PROSTATE CANCER SCREENING: ICD-10-CM

## 2022-10-28 LAB
ALBUMIN SERPL-MCNC: 4.8 G/DL (ref 3.5–5.2)
ALBUMIN UR-MCNC: 1.7 MG/DL
ALBUMIN/GLOB SERPL: 1.7 G/DL
ALP SERPL-CCNC: 79 U/L (ref 39–117)
ALT SERPL W P-5'-P-CCNC: 11 U/L (ref 1–41)
ANION GAP SERPL CALCULATED.3IONS-SCNC: 11.6 MMOL/L (ref 5–15)
AST SERPL-CCNC: 26 U/L (ref 1–40)
BILIRUB SERPL-MCNC: 0.7 MG/DL (ref 0–1.2)
BUN SERPL-MCNC: 14 MG/DL (ref 8–23)
BUN/CREAT SERPL: 13.2 (ref 7–25)
CALCIUM SPEC-SCNC: 9.5 MG/DL (ref 8.6–10.5)
CHLORIDE SERPL-SCNC: 104 MMOL/L (ref 98–107)
CHOLEST SERPL-MCNC: 203 MG/DL (ref 0–200)
CO2 SERPL-SCNC: 25.4 MMOL/L (ref 22–29)
CREAT SERPL-MCNC: 1.06 MG/DL (ref 0.76–1.27)
CREAT UR-MCNC: 210.5 MG/DL
EGFRCR SERPLBLD CKD-EPI 2021: 76.9 ML/MIN/1.73
GLOBULIN UR ELPH-MCNC: 2.8 GM/DL
GLUCOSE SERPL-MCNC: 94 MG/DL (ref 65–99)
HBA1C MFR BLD: 5.8 % (ref 4.8–5.6)
HDLC SERPL-MCNC: 42 MG/DL (ref 40–60)
LDLC SERPL CALC-MCNC: 148 MG/DL (ref 0–100)
LDLC/HDLC SERPL: 3.5 {RATIO}
MICROALBUMIN/CREAT UR: 8.1 MG/G
POTASSIUM SERPL-SCNC: 4.5 MMOL/L (ref 3.5–5.2)
PROT SERPL-MCNC: 7.6 G/DL (ref 6–8.5)
PSA SERPL-MCNC: 0.35 NG/ML (ref 0–4)
SODIUM SERPL-SCNC: 141 MMOL/L (ref 136–145)
TRIGL SERPL-MCNC: 71 MG/DL (ref 0–150)
URATE SERPL-MCNC: 7.2 MG/DL (ref 3.4–7)
VLDLC SERPL-MCNC: 13 MG/DL (ref 5–40)

## 2022-10-28 PROCEDURE — 0124A PR ADM SARSCOV2 30MCG/0.3ML BST: CPT | Performed by: INTERNAL MEDICINE

## 2022-10-28 PROCEDURE — G0439 PPPS, SUBSEQ VISIT: HCPCS | Performed by: INTERNAL MEDICINE

## 2022-10-28 PROCEDURE — 82172 ASSAY OF APOLIPOPROTEIN: CPT

## 2022-10-28 PROCEDURE — 90662 IIV NO PRSV INCREASED AG IM: CPT | Performed by: INTERNAL MEDICINE

## 2022-10-28 PROCEDURE — 83036 HEMOGLOBIN GLYCOSYLATED A1C: CPT

## 2022-10-28 PROCEDURE — 84550 ASSAY OF BLOOD/URIC ACID: CPT

## 2022-10-28 PROCEDURE — 1170F FXNL STATUS ASSESSED: CPT | Performed by: INTERNAL MEDICINE

## 2022-10-28 PROCEDURE — 1125F AMNT PAIN NOTED PAIN PRSNT: CPT | Performed by: INTERNAL MEDICINE

## 2022-10-28 PROCEDURE — 80053 COMPREHEN METABOLIC PANEL: CPT

## 2022-10-28 PROCEDURE — 36415 COLL VENOUS BLD VENIPUNCTURE: CPT

## 2022-10-28 PROCEDURE — G0008 ADMIN INFLUENZA VIRUS VAC: HCPCS | Performed by: INTERNAL MEDICINE

## 2022-10-28 PROCEDURE — 82043 UR ALBUMIN QUANTITATIVE: CPT

## 2022-10-28 PROCEDURE — G0103 PSA SCREENING: HCPCS

## 2022-10-28 PROCEDURE — 82570 ASSAY OF URINE CREATININE: CPT

## 2022-10-28 PROCEDURE — 80061 LIPID PANEL: CPT

## 2022-10-28 PROCEDURE — 99397 PER PM REEVAL EST PAT 65+ YR: CPT | Performed by: INTERNAL MEDICINE

## 2022-10-28 PROCEDURE — 91312 COVID-19 (PFIZER) BIVALENT BOOSTER 12+YRS: CPT | Performed by: INTERNAL MEDICINE

## 2022-10-28 PROCEDURE — 1159F MED LIST DOCD IN RCRD: CPT | Performed by: INTERNAL MEDICINE

## 2022-10-28 RX ORDER — DEXTROAMPHETAMINE SACCHARATE, AMPHETAMINE ASPARTATE, DEXTROAMPHETAMINE SULFATE AND AMPHETAMINE SULFATE 2.5; 2.5; 2.5; 2.5 MG/1; MG/1; MG/1; MG/1
10 TABLET ORAL 2 TIMES DAILY
Qty: 60 TABLET | Refills: 0 | Status: SHIPPED | OUTPATIENT
Start: 2022-10-28 | End: 2023-01-31 | Stop reason: SDUPTHER

## 2022-10-28 NOTE — PROGRESS NOTES
QUICK REFERENCE INFORMATION:  The ABCs of the Annual Wellness Visit    Subsequent Medicare Wellness Visit    HEALTH RISK ASSESSMENT    1955    Recent Hospitalizations:  No hospitalization(s) within the last year..        Current Medical Providers:  Patient Care Team:  Ridge Hunter MD as PCP - General  Ridge Hunter MD as PCP - Family Medicine        Smoking Status:  Social History     Tobacco Use   Smoking Status Never   Smokeless Tobacco Never       Alcohol Consumption:  Social History     Substance and Sexual Activity   Alcohol Use No       Depression Screen:   PHQ-2/PHQ-9 Depression Screening 10/28/2022   Retired Total Score -   Little Interest or Pleasure in Doing Things 0-->not at all   Feeling Down, Depressed or Hopeless 0-->not at all   PHQ-9: Brief Depression Severity Measure Score 0       Health Habits and Functional and Cognitive Screening:  Functional & Cognitive Status 10/28/2022   Do you have difficulty preparing food and eating? No   Do you have difficulty bathing yourself, getting dressed or grooming yourself? No   Do you have difficulty using the toilet? No   Do you have difficulty moving around from place to place? No   Do you have trouble with steps or getting out of a bed or a chair? No   Current Diet Well Balanced Diet   Dental Exam Not up to date   Eye Exam Not up to date   Exercise (times per week) 0 times per week   Current Exercises Include Walking;No Regular Exercise   Do you need help using the phone?  No   Are you deaf or do you have serious difficulty hearing?  No   Do you need help with transportation? No   Do you need help shopping? No   Do you need help preparing meals?  No   Do you need help with housework?  No   Do you need help with laundry? No   Do you need help taking your medications? No   Do you need help managing money? No   Do you ever drive or ride in a car without wearing a seat belt? No   Have you felt unusual stress, anger or loneliness in the last  month? No   Who do you live with? Alone   If you need help, do you have trouble finding someone available to you? No   Have you been bothered in the last four weeks by sexual problems? No   Do you have difficulty concentrating, remembering or making decisions? No       Fall Risk Screen:  DANUTA Fall Risk Assessment was completed, and patient is at LOW risk for falls.Assessment completed on:10/28/2022    ACE III MINI        Does the patient have evidence of cognitive impairment? No    Aspirin use counseling: Does not need ASA (and currently is not on it)    Recent Lab Results:  CMP:  Lab Results   Component Value Date    BUN 14 10/28/2022    CREATININE 1.06 10/28/2022    EGFRIFNONA 87 10/05/2021    EGFRIFAFRI 107 03/22/2021    BCR 13.2 10/28/2022     10/28/2022    K 4.5 10/28/2022    CO2 25.4 10/28/2022    CALCIUM 9.5 10/28/2022    PROTENTOTREF 6.7 03/22/2021    ALBUMIN 4.80 10/28/2022    LABGLOBREF 2.2 03/22/2021    LABIL2 2.0 03/22/2021    BILITOT 0.7 10/28/2022    ALKPHOS 79 10/28/2022    AST 26 10/28/2022    ALT 11 10/28/2022     HbA1c:  Lab Results   Component Value Date    HGBA1C 5.80 (H) 10/28/2022    HGBA1C 5.8 07/19/2022     Microalbumin:  Lab Results   Component Value Date    MICROALBUR 1.7 10/28/2022     Lipid Panel  Lab Results   Component Value Date    CHOL 203 (H) 10/28/2022    TRIG 71 10/28/2022    HDL 42 10/28/2022     (H) 10/28/2022    AST 26 10/28/2022    ALT 11 10/28/2022       Visual Acuity:  No results found.    Age-appropriate Screening Schedule:  Refer to the list below for future screening recommendations based on patient's age, sex and/or medical conditions. Orders for these recommended tests are listed in the plan section. The patient has been provided with a written plan.    Health Maintenance   Topic Date Due   • ZOSTER VACCINE (1 of 2) Never done   • TDAP/TD VACCINES (2 - Td or Tdap) 04/10/2019   • DIABETIC EYE EXAM  07/30/2019   • DIABETIC FOOT EXAM  03/23/2022   •  HEMOGLOBIN A1C  04/28/2023   • LIPID PANEL  10/28/2023   • URINE MICROALBUMIN  10/28/2023   • INFLUENZA VACCINE  Completed        Subjective   History of Present Illness    Cody Sanchez is a 67 y.o. male who presents for a Subsequent Wellness Visit and for follow-up of diabetes, hyperlipidemia, allergic rhinitis, asthma, and attention deficit.    Health maintenance  The patient states he is doing well physically. He denies any concerns or issues. He states he needs to get his eyes checked. He states he has trouble with seeing long distance and feels like there is a light layer of wax in his left eye. He states he can see with his left eye, but not his right eye. He states he has had a total of 3 COVID-19 vaccines.    Left shoulder pain  The patient states he has constant pain in his left shoulder and is unsure if it is co related to the COVID vaccines. He states it could have been from carrying his backpack over his left shoulder. He states he took his flu shot yesterday and felt fine.    CHRONIC CONDITIONS Diabetes, hyperlipidemia, allergic rhinitis, asthma and attention deficit.    The following portions of the patient's history were reviewed and updated as appropriate: allergies, current medications, past family history, past medical history, past social history, past surgical history and problem list.    Outpatient Medications Prior to Visit   Medication Sig Dispense Refill   • albuterol sulfate  (90 Base) MCG/ACT inhaler Inhale 2 puffs by inhalation route every 4-6 hours as needed 1 g 5   • B Complex Vitamins (VITAMIN-B COMPLEX PO) Take 1 tablet by mouth Daily.     • BIOTIN PO Take 1 tablet by mouth Every Other Day.     • Cholecalciferol (VITAMIN D3) 1000 units capsule Take 1 capsule by mouth Every Other Day.     • Fluticasone Furoate-Vilanterol (Breo Ellipta) 200-25 MCG/INH inhaler Inhale 1 puff Daily. 1 each 5   • Magnesium Gluconate 550 MG tablet Take 1 tablet by mouth Every Other Day.     •  MULTIPLE VITAMIN PO Take 1 tablet by mouth Daily.     • VITAMIN A PO Take 1 tablet by mouth Daily.     • vitamin C (ASCORBIC ACID) 500 MG tablet Take 1,000 mg by mouth Daily.     • VITAMIN E COMPLEX PO Take 1 tablet by mouth Every Other Day.     • Zinc 50 MG tablet Take 1 tablet by mouth Every Other Day.     • amphetamine-dextroamphetamine (Adderall) 10 MG tablet Take 1 tablet by mouth 2 (Two) Times a Day. 60 tablet 0     No facility-administered medications prior to visit.       Patient Active Problem List   Diagnosis   • Annual physical exam   • Gout, unspecified   • Attention deficit hyperactivity disorder, predominantly inattentive type   • Exacerbation of asthma   • Obesity   • Allergic rhinitis   • Mixed hyperlipidemia   • Type 2 diabetes mellitus with hyperglycemia, without long-term current use of insulin (HCC)   • Mild intermittent asthma without complication       Advance Care Planning:  ACP discussion was held with the patient during this visit. Patient has an advance directive (not in EMR), copy requested.    Identification of Risk Factors:  Risk factors include: Advance Directive Discussion.    Review of Systems   Constitutional: Negative for chills, fatigue and fever.   HENT: Positive for tinnitus. Negative for congestion, ear pain and sinus pressure.    Respiratory: Negative for cough, chest tightness, shortness of breath and wheezing.    Cardiovascular: Negative for chest pain and palpitations.   Gastrointestinal: Negative for abdominal pain, blood in stool and constipation.   Skin: Negative for color change.   Allergic/Immunologic: Negative for environmental allergies.   Neurological: Negative for dizziness, speech difficulty and headaches.   Psychiatric/Behavioral: Negative for confusion. The patient is not nervous/anxious.        Compared to one year ago, the patient feels his physical health is the same.  Compared to one year ago, the patient feels his mental health is the same.    Objective      Physical Exam  Vitals and nursing note reviewed.   Constitutional:       General: He is not in acute distress.     Appearance: He is well-developed. He is not diaphoretic.   HENT:      Head: Normocephalic and atraumatic.      Right Ear: External ear normal.      Left Ear: External ear normal.      Nose: Nose normal.      Mouth/Throat:      Pharynx: No oropharyngeal exudate.   Eyes:      Conjunctiva/sclera: Conjunctivae normal.      Pupils: Pupils are equal, round, and reactive to light.   Neck:      Thyroid: No thyromegaly.      Vascular: No JVD.   Cardiovascular:      Rate and Rhythm: Normal rate and regular rhythm.      Pulses:           Dorsalis pedis pulses are 1+ on the right side and 1+ on the left side.      Heart sounds: Normal heart sounds. No murmur heard.    No friction rub. No gallop.   Pulmonary:      Effort: Pulmonary effort is normal. No respiratory distress.      Breath sounds: Normal breath sounds. No wheezing or rales.   Chest:      Chest wall: No tenderness.   Abdominal:      General: Bowel sounds are normal. There is no distension.      Palpations: Abdomen is soft. There is no mass.      Tenderness: There is no abdominal tenderness. There is no guarding or rebound.      Hernia: No hernia is present.   Musculoskeletal:         General: No tenderness. Normal range of motion.      Cervical back: Normal range of motion and neck supple.      Right foot: No deformity.      Left foot: No deformity.      Comments: hammertoes bilaterally in the second and third toes. There is a callus at the third metatarsal on the left.   Feet:      Right foot:      Protective Sensation: 5 sites tested. 5 sites sensed.      Skin integrity: Skin integrity normal.      Left foot:      Protective Sensation: 5 sites tested. 5 sites sensed.      Skin integrity: Skin integrity normal.      Comments: Sensation in both feet intact to monofilament.     Diabetic Foot Exam Performed and Monofilament Test  "Performed    Lymphadenopathy:      Cervical: No cervical adenopathy.   Skin:     General: Skin is warm and dry.      Capillary Refill: Capillary refill takes less than 2 seconds.      Findings: No erythema or rash.   Neurological:      Mental Status: He is alert and oriented to person, place, and time.      Cranial Nerves: No cranial nerve deficit.      Sensory: No sensory deficit.      Motor: No abnormal muscle tone.      Coordination: Coordination normal.      Deep Tendon Reflexes: Reflexes normal.   Psychiatric:         Behavior: Behavior normal.         Thought Content: Thought content normal.         Judgment: Judgment normal.          Procedures     Vitals:    10/28/22 0957   BP: 110/82   BP Location: Left arm   Patient Position: Sitting   Cuff Size: Large Adult   Pulse: 72   Temp: 98 °F (36.7 °C)   Weight: 109 kg (241 lb 3.2 oz)   Height: 187.5 cm (73.82\")   PainSc:   4   PainLoc: Shoulder  Comment: left       BMI is >= 30 and <35. (Class 1 Obesity). The following options were offered after discussion;: exercise counseling/recommendations and nutrition counseling/recommendations      Assessment & Plan   Problem List Items Addressed This Visit        Allergies and Adverse Reactions    Allergic rhinitis       Cardiac and Vasculature    Mixed hyperlipidemia    Relevant Orders    Lipid Panel (Completed)    Apolipoprotein B       Endocrine and Metabolic    Type 2 diabetes mellitus with hyperglycemia, without long-term current use of insulin (HCC)    Relevant Orders    Comprehensive Metabolic Panel (Completed)    Hemoglobin A1c (Completed)    Microalbumin / Creatinine Urine Ratio - Urine, Clean Catch (Completed)       Health Encounters    Annual physical exam - Primary       Mental Health    Attention deficit hyperactivity disorder, predominantly inattentive type    Relevant Medications    amphetamine-dextroamphetamine (Adderall) 10 MG tablet       Musculoskeletal and Injuries    Gout, unspecified    Relevant " Orders    Uric Acid (Completed)       Pulmonary and Pneumonias    Mild intermittent asthma without complication    Relevant Medications    albuterol sulfate  (90 Base) MCG/ACT inhaler    Fluticasone Furoate-Vilanterol (Breo Ellipta) 200-25 MCG/INH inhaler   Other Visit Diagnoses     Prostate cancer screening        Relevant Orders    PSA Screen (Completed)    Colon cancer screening        Relevant Orders    Cologuard - Stool, Per Rectum    Needs flu shot        Relevant Orders    Fluzone High-Dose 65+yrs (0655-5958) (Completed)        Patient Self-Management and Personalized Health Advice  The patient has been provided with information about: diet, exercise and weight management and preventive services including:   · Annual Wellness Visit (AWV).    Outpatient Encounter Medications as of 10/28/2022   Medication Sig Dispense Refill   • albuterol sulfate  (90 Base) MCG/ACT inhaler Inhale 2 puffs by inhalation route every 4-6 hours as needed 1 g 5   • amphetamine-dextroamphetamine (Adderall) 10 MG tablet Take 1 tablet by mouth 2 (Two) Times a Day. 60 tablet 0   • B Complex Vitamins (VITAMIN-B COMPLEX PO) Take 1 tablet by mouth Daily.     • BIOTIN PO Take 1 tablet by mouth Every Other Day.     • Cholecalciferol (VITAMIN D3) 1000 units capsule Take 1 capsule by mouth Every Other Day.     • Fluticasone Furoate-Vilanterol (Breo Ellipta) 200-25 MCG/INH inhaler Inhale 1 puff Daily. 1 each 5   • Magnesium Gluconate 550 MG tablet Take 1 tablet by mouth Every Other Day.     • MULTIPLE VITAMIN PO Take 1 tablet by mouth Daily.     • VITAMIN A PO Take 1 tablet by mouth Daily.     • vitamin C (ASCORBIC ACID) 500 MG tablet Take 1,000 mg by mouth Daily.     • VITAMIN E COMPLEX PO Take 1 tablet by mouth Every Other Day.     • Zinc 50 MG tablet Take 1 tablet by mouth Every Other Day.     • [DISCONTINUED] amphetamine-dextroamphetamine (Adderall) 10 MG tablet Take 1 tablet by mouth 2 (Two) Times a Day. 60 tablet 0     No  facility-administered encounter medications on file as of 10/28/2022.   1.Prevention  - Overall, he remains in good health with improved lifestyle habits. Covid vaccine and influenza vaccine are given today. Cologuard is ordered.    2. Diabetes Mellitus  - Hemoglobin A1c is pending. He has done a good job controlling this with diet. He is due for eye examination. Foot exam was performed today.    3. Hyperlipidemia  - Lipid panel is pending. The treatment remains healthy diet and avoidance of weight loss.    4.Allergic rhinitis and Asthma.  - He will continue albuterol and Breo as needed.    5. Attention deficit disorder.  - He continues to have a good response to Adderall.    6. Gout  - Gout remains asymptomatic. Controlled with diet.    Reviewed use of high risk medication in the elderly: yes  Reviewed for potential of harmful drug interactions in the elderly: yes    Follow Up:  Return in about 3 months (around 1/28/2023) for follow up.   He will follow up in 3 months.  There are no Patient Instructions on file for this visit.    An After Visit Summary and PPPS with all of these plans were given to the patient.        Transcribed from ambient dictation for Ridge Hunter MD by Humberto Costello.  10/28/22   11:30 EDT    Patient or patient representative verbalized consent to the visit recording.  I have personally performed the services described in this document as transcribed by the above individual, and it is both accurate and complete.

## 2022-11-01 LAB — APO B SERPL-MCNC: 119 MG/DL

## 2023-01-31 ENCOUNTER — OFFICE VISIT (OUTPATIENT)
Dept: INTERNAL MEDICINE | Facility: CLINIC | Age: 68
End: 2023-01-31
Payer: MEDICARE

## 2023-01-31 VITALS
BODY MASS INDEX: 31.65 KG/M2 | TEMPERATURE: 98.4 F | DIASTOLIC BLOOD PRESSURE: 82 MMHG | WEIGHT: 246.6 LBS | HEIGHT: 74 IN | SYSTOLIC BLOOD PRESSURE: 124 MMHG | HEART RATE: 80 BPM

## 2023-01-31 DIAGNOSIS — E78.2 MIXED HYPERLIPIDEMIA: ICD-10-CM

## 2023-01-31 DIAGNOSIS — F90.0 ATTENTION DEFICIT HYPERACTIVITY DISORDER, PREDOMINANTLY INATTENTIVE TYPE: ICD-10-CM

## 2023-01-31 DIAGNOSIS — E11.65 TYPE 2 DIABETES MELLITUS WITH HYPERGLYCEMIA, WITHOUT LONG-TERM CURRENT USE OF INSULIN: Primary | ICD-10-CM

## 2023-01-31 DIAGNOSIS — J45.20 MILD INTERMITTENT ASTHMA WITHOUT COMPLICATION: ICD-10-CM

## 2023-01-31 LAB
EXPIRATION DATE: NORMAL
HBA1C MFR BLD: 5.7 %
Lab: NORMAL

## 2023-01-31 PROCEDURE — 83036 HEMOGLOBIN GLYCOSYLATED A1C: CPT | Performed by: INTERNAL MEDICINE

## 2023-01-31 PROCEDURE — 3044F HG A1C LEVEL LT 7.0%: CPT | Performed by: INTERNAL MEDICINE

## 2023-01-31 PROCEDURE — 99214 OFFICE O/P EST MOD 30 MIN: CPT | Performed by: INTERNAL MEDICINE

## 2023-01-31 RX ORDER — FLUTICASONE FUROATE AND VILANTEROL 200; 25 UG/1; UG/1
1 POWDER RESPIRATORY (INHALATION)
Qty: 60 EACH | Refills: 5 | Status: SHIPPED | OUTPATIENT
Start: 2023-01-31

## 2023-01-31 RX ORDER — ROSUVASTATIN CALCIUM 10 MG/1
10 TABLET, COATED ORAL DAILY
Qty: 90 TABLET | Refills: 3 | Status: SHIPPED | OUTPATIENT
Start: 2023-01-31

## 2023-01-31 RX ORDER — DEXTROAMPHETAMINE SACCHARATE, AMPHETAMINE ASPARTATE, DEXTROAMPHETAMINE SULFATE AND AMPHETAMINE SULFATE 2.5; 2.5; 2.5; 2.5 MG/1; MG/1; MG/1; MG/1
10 TABLET ORAL 2 TIMES DAILY
Qty: 60 TABLET | Refills: 0 | Status: SHIPPED | OUTPATIENT
Start: 2023-01-31 | End: 2023-04-05 | Stop reason: SDUPTHER

## 2023-04-05 DIAGNOSIS — F90.0 ATTENTION DEFICIT HYPERACTIVITY DISORDER, PREDOMINANTLY INATTENTIVE TYPE: ICD-10-CM

## 2023-04-05 RX ORDER — ALBUTEROL SULFATE 90 UG/1
AEROSOL, METERED RESPIRATORY (INHALATION)
Qty: 1 G | Refills: 5 | Status: SHIPPED | OUTPATIENT
Start: 2023-04-05

## 2023-04-05 RX ORDER — DEXTROAMPHETAMINE SACCHARATE, AMPHETAMINE ASPARTATE, DEXTROAMPHETAMINE SULFATE AND AMPHETAMINE SULFATE 2.5; 2.5; 2.5; 2.5 MG/1; MG/1; MG/1; MG/1
10 TABLET ORAL 2 TIMES DAILY
Qty: 60 TABLET | Refills: 0 | Status: SHIPPED | OUTPATIENT
Start: 2023-04-05

## 2023-04-05 NOTE — TELEPHONE ENCOUNTER
"    Caller: Cody Sanchez \"Joseluis\"    Relationship: Self    Best call back number: [phone]    Requested Prescriptions:   Requested Prescriptions     Pending Prescriptions Disp Refills   • amphetamine-dextroamphetamine (Adderall) 10 MG tablet 60 tablet 0     Sig: Take 1 tablet by mouth 2 (Two) Times a Day.   • albuterol sulfate  (90 Base) MCG/ACT inhaler 1 g 5     Sig: Inhale 2 puffs by inhalation route every 4-6 hours as needed        Pharmacy where request should be sent: [facility] [id number] - [phone]  - [phone] FX     Last office visit with prescribing clinician: 1/31/2023   Last telemedicine visit with prescribing clinician: 4/25/2023   Next office visit with prescribing clinician: 4/25/2023     Additional details provided by patient:OUT OF ALBUTEROL. 3 DAYS LEFT OF ADDERALL    Does the patient have less than a 3 day supply:  [x] Yes  [] No    Would you like a call back once the refill request has been completed: [] Yes [x] No    If the office needs to give you a call back, can they leave a voicemail: [] Yes [x] No    Luis Alberto Montgomery Rep   04/05/23 13:08 EDT           "

## 2023-04-05 NOTE — TELEPHONE ENCOUNTER
Rx Refill Note  Requested Prescriptions     Pending Prescriptions Disp Refills   • amphetamine-dextroamphetamine (Adderall) 10 MG tablet 60 tablet 0     Sig: Take 1 tablet by mouth 2 (Two) Times a Day.   • albuterol sulfate  (90 Base) MCG/ACT inhaler 1 g 5     Sig: Inhale 2 puffs by inhalation route every 4-6 hours as needed      Last office visit with prescribing clinician: 1/31/2023   Last telemedicine visit with prescribing clinician: 4/25/2023   Next office visit with prescribing clinician: 4/25/2023                         Would you like a call back once the refill request has been completed: [] Yes [] No    If the office needs to give you a call back, can they leave a voicemail: [] Yes [] No    Dora Sanchez LPN  04/05/23, 13:10 EDT

## 2023-04-25 ENCOUNTER — LAB (OUTPATIENT)
Dept: LAB | Facility: HOSPITAL | Age: 68
End: 2023-04-25
Payer: MEDICARE

## 2023-04-25 ENCOUNTER — OFFICE VISIT (OUTPATIENT)
Dept: INTERNAL MEDICINE | Facility: CLINIC | Age: 68
End: 2023-04-25
Payer: MEDICARE

## 2023-04-25 VITALS
TEMPERATURE: 98.4 F | SYSTOLIC BLOOD PRESSURE: 126 MMHG | BODY MASS INDEX: 31.42 KG/M2 | DIASTOLIC BLOOD PRESSURE: 82 MMHG | WEIGHT: 244.8 LBS | HEIGHT: 74 IN | HEART RATE: 80 BPM

## 2023-04-25 DIAGNOSIS — E11.65 TYPE 2 DIABETES MELLITUS WITH HYPERGLYCEMIA, WITHOUT LONG-TERM CURRENT USE OF INSULIN: Primary | ICD-10-CM

## 2023-04-25 DIAGNOSIS — F90.0 ATTENTION DEFICIT HYPERACTIVITY DISORDER, PREDOMINANTLY INATTENTIVE TYPE: ICD-10-CM

## 2023-04-25 DIAGNOSIS — E78.2 MIXED HYPERLIPIDEMIA: ICD-10-CM

## 2023-04-25 DIAGNOSIS — E11.65 TYPE 2 DIABETES MELLITUS WITH HYPERGLYCEMIA, WITHOUT LONG-TERM CURRENT USE OF INSULIN: ICD-10-CM

## 2023-04-25 DIAGNOSIS — J45.20 MILD INTERMITTENT ASTHMA WITHOUT COMPLICATION: ICD-10-CM

## 2023-04-25 LAB
ALBUMIN SERPL-MCNC: 4.7 G/DL (ref 3.5–5.2)
ALBUMIN/GLOB SERPL: 1.8 G/DL
ALP SERPL-CCNC: 78 U/L (ref 39–117)
ALT SERPL W P-5'-P-CCNC: 17 U/L (ref 1–41)
ANION GAP SERPL CALCULATED.3IONS-SCNC: 12.1 MMOL/L (ref 5–15)
AST SERPL-CCNC: 24 U/L (ref 1–40)
BILIRUB SERPL-MCNC: 1 MG/DL (ref 0–1.2)
BUN SERPL-MCNC: 13 MG/DL (ref 8–23)
BUN/CREAT SERPL: 10.7 (ref 7–25)
CALCIUM SPEC-SCNC: 10.2 MG/DL (ref 8.6–10.5)
CHLORIDE SERPL-SCNC: 103 MMOL/L (ref 98–107)
CHOLEST SERPL-MCNC: 183 MG/DL (ref 0–200)
CO2 SERPL-SCNC: 24.9 MMOL/L (ref 22–29)
CREAT SERPL-MCNC: 1.22 MG/DL (ref 0.76–1.27)
EGFRCR SERPLBLD CKD-EPI 2021: 65 ML/MIN/1.73
GLOBULIN UR ELPH-MCNC: 2.6 GM/DL
GLUCOSE SERPL-MCNC: 83 MG/DL (ref 65–99)
HBA1C MFR BLD: 6.5 % (ref 4.8–5.6)
HDLC SERPL-MCNC: 40 MG/DL (ref 40–60)
LDLC SERPL CALC-MCNC: 131 MG/DL (ref 0–100)
LDLC/HDLC SERPL: 3.26 {RATIO}
POTASSIUM SERPL-SCNC: 5 MMOL/L (ref 3.5–5.2)
PROT SERPL-MCNC: 7.3 G/DL (ref 6–8.5)
SODIUM SERPL-SCNC: 140 MMOL/L (ref 136–145)
TRIGL SERPL-MCNC: 64 MG/DL (ref 0–150)
VLDLC SERPL-MCNC: 12 MG/DL (ref 5–40)

## 2023-04-25 PROCEDURE — 3044F HG A1C LEVEL LT 7.0%: CPT | Performed by: INTERNAL MEDICINE

## 2023-04-25 PROCEDURE — 99214 OFFICE O/P EST MOD 30 MIN: CPT | Performed by: INTERNAL MEDICINE

## 2023-04-25 PROCEDURE — 82172 ASSAY OF APOLIPOPROTEIN: CPT

## 2023-04-25 PROCEDURE — 1159F MED LIST DOCD IN RCRD: CPT | Performed by: INTERNAL MEDICINE

## 2023-04-25 PROCEDURE — 1160F RVW MEDS BY RX/DR IN RCRD: CPT | Performed by: INTERNAL MEDICINE

## 2023-04-25 PROCEDURE — 83036 HEMOGLOBIN GLYCOSYLATED A1C: CPT

## 2023-04-25 PROCEDURE — 80061 LIPID PANEL: CPT

## 2023-04-25 PROCEDURE — 36415 COLL VENOUS BLD VENIPUNCTURE: CPT

## 2023-04-25 PROCEDURE — 80053 COMPREHEN METABOLIC PANEL: CPT

## 2023-04-25 RX ORDER — ROSUVASTATIN CALCIUM 10 MG/1
10 TABLET, COATED ORAL DAILY
Qty: 90 TABLET | Refills: 3 | Status: SHIPPED | OUTPATIENT
Start: 2023-04-25 | End: 2023-04-30 | Stop reason: SDUPTHER

## 2023-04-25 NOTE — PROGRESS NOTES
[facility]     Cody Sanchez  1955   [id number]      Patient Care Team:  Ridge Hunter MD as PCP - General  Ridge Hunter MD as PCP - Family Medicine    Chief Complaint::   Chief Complaint   Patient presents with   • Diabetes   • ADHD        HPI  The patient comes in for follow-up for diabetes mellitus, hyperlipidemia, attention deficit disorder, and asthma.    General health maintenance  The patient is doing better physically. The pain in his left shoulder has lessened. He is no longer using shoe inserts. He has been trying to improve his walking. He is due for laboratory work today, and he is fasting.     Asthma  The patient's asthma and allergies are well and manageable.     Attention deficit disorder  The patient is taking Adderall 10 mg twice daily. He has tried to decrease his dosage but notes that he experiences more hyperactivity. He has enough refills left for Adderall.     Hyperlipidemia  The patient's laboratory work on 10/28/2022 showed elevated total cholesterol and low-density lipoprotein levels. He has not started taking rosuvastatin yet because he had issues getting it from the pharmacy. He requests a renewal of his rosuvastatin prescription.     Seborrheic keratosis  The patient has a skin lesion on his head and wrist on right. Twisting the jar causes him to experience pain.     Chronic Conditions: Diabetes mellitus, hyperlipidemia, attention deficit disorder, and asthma.    Patient Active Problem List   Diagnosis   • Annual physical exam   • Gout, unspecified   • Attention deficit hyperactivity disorder, predominantly inattentive type   • Exacerbation of asthma   • Obesity   • Allergic rhinitis   • Mixed hyperlipidemia   • Type 2 diabetes mellitus with hyperglycemia, without long-term current use of insulin (HCC)   • Mild intermittent asthma without complication        Past Medical History:   Diagnosis Date   • Deep vein thrombosis        Past Surgical  History:   Procedure Laterality Date   • TONSILLECTOMY         Family History   Problem Relation Age of Onset   • Hypertension Mother    • Coronary artery disease Mother    • Hypertension Father    • Coronary artery disease Father    • Cancer Brother         PROSTATE CANCER        Social History     Socioeconomic History   • Marital status:    Tobacco Use   • Smoking status: Never   • Smokeless tobacco: Never   Substance and Sexual Activity   • Alcohol use: No   • Drug use: No       No Known Allergies      Current Outpatient Medications:   •  albuterol sulfate  (90 Base) MCG/ACT inhaler, Inhale 2 puffs by inhalation route every 4-6 hours as needed, Disp: 1 g, Rfl: 5  •  amphetamine-dextroamphetamine (Adderall) 10 MG tablet, Take 1 tablet by mouth 2 (Two) Times a Day., Disp: 60 tablet, Rfl: 0  •  B Complex Vitamins (VITAMIN-B COMPLEX PO), Take 1 tablet by mouth Daily., Disp: , Rfl:   •  BIOTIN PO, Take 1 tablet by mouth Every Other Day., Disp: , Rfl:   •  Cholecalciferol (VITAMIN D3) 1000 units capsule, Take 500 Units by mouth Every Other Day., Disp: , Rfl:   •  Fluticasone Furoate-Vilanterol (Breo Ellipta) 200-25 MCG/ACT inhaler, Inhale 1 puff Daily., Disp: 60 each, Rfl: 5  •  Magnesium Gluconate 550 MG tablet, Take 1 tablet by mouth Every Other Day., Disp: , Rfl:   •  MULTIPLE VITAMIN PO, Take 1 tablet by mouth Daily., Disp: , Rfl:   •  rosuvastatin (Crestor) 10 MG tablet, Take 1 tablet by mouth Daily., Disp: 90 tablet, Rfl: 3  •  VITAMIN A PO, Take 1 tablet by mouth Daily., Disp: , Rfl:   •  vitamin C (ASCORBIC ACID) 500 MG tablet, Take 2 tablets by mouth Daily., Disp: , Rfl:   •  VITAMIN E COMPLEX PO, Take 1 tablet by mouth Every Other Day., Disp: , Rfl:   •  Zinc 50 MG tablet, Take 1 tablet by mouth Every Other Day., Disp: , Rfl:     Review of Systems   Constitutional: Negative for chills, fatigue and fever.   HENT: Negative for congestion, ear pain and sinus pressure.    Respiratory: Negative for  "cough, chest tightness, shortness of breath and wheezing.    Cardiovascular: Negative for chest pain and palpitations.   Gastrointestinal: Negative for abdominal pain, blood in stool and constipation.   Skin: Negative for color change.   Allergic/Immunologic: Negative for environmental allergies.   Neurological: Negative for dizziness, speech difficulty and headache.   Psychiatric/Behavioral: Negative for decreased concentration. The patient is not nervous/anxious.         Vital Signs  Vitals:    04/25/23 0823   BP: 126/82   BP Location: Left arm   Patient Position: Sitting   Cuff Size: Large Adult   Pulse: 80   Temp: 98.4 °F (36.9 °C)   Weight: 111 kg (244 lb 12.8 oz)   Height: 187.5 cm (73.82\")   PainSc:   6   PainLoc: Shoulder       Physical Exam  Vitals reviewed.   Constitutional:       Appearance: He is well-developed.      Comments: He is overweight.   HENT:      Head: Normocephalic and atraumatic.   Eyes:      Pupils: Pupils are equal, round, and reactive to light.   Cardiovascular:      Rate and Rhythm: Normal rate and regular rhythm.      Heart sounds: Normal heart sounds.   Pulmonary:      Effort: Pulmonary effort is normal.      Breath sounds: Normal breath sounds.   Abdominal:      General: Bowel sounds are normal.      Palpations: Abdomen is soft.   Musculoskeletal:         General: Normal range of motion.      Cervical back: Normal range of motion.   Skin:     General: Skin is warm and dry.   Neurological:      Mental Status: He is alert and oriented to person, place, and time.          Procedures    ACE III MINI             Assessment/Plan:    Diagnoses and all orders for this visit:    1. Type 2 diabetes mellitus with hyperglycemia, without long-term current use of insulin (HCC) (Primary)  -     Comprehensive Metabolic Panel; Future  -     Hemoglobin A1c; Future    2. Mixed hyperlipidemia  -     Comprehensive Metabolic Panel; Future  -     Apolipoprotein B; Future  -     Lipid Panel; Future    3. " Attention deficit hyperactivity disorder, predominantly inattentive type    4. Mild intermittent asthma without complication    Other orders  -     rosuvastatin (Crestor) 10 MG tablet; Take 1 tablet by mouth Daily.  Dispense: 90 tablet; Refill: 3        1. Diabetes mellitus  The patient's hemoglobin A1c is pending. He is due for an eye examination, but he is up to date on foot examinations and nephropathy screening.    2. Hyperlipidemia  The patient's lipid panel is pending. Unfortunately, he is not currently taking rosuvastatin. Apparently, there was some confusion about his prescription. I have sent it to the pharmacy again. Apolipoprotein B levels of 119 mg/dL place him in a higher risk category, so I emphasized the importance of being on a statin.    3. Attention deficit disorder  He is doing well on this, but with longer hours at work, the medicine is running out. We will increase the dose to 15 mg twice a day, which will probably require 1.5 tablets of the 10 mg. This will be changed when the medicine is due to be renewed.    4. Asthma  The patient's asthma is well compensated on Breo and as-needed albuterol.    The patient will follow up in 3 months.    Plan of care reviewed with patient at the conclusion of today's visit. Education was provided regarding diagnosis, management, and any prescribed or recommended OTC medications.Patient verbalizes understanding of and agreement with management plan.       Ridge Hunter MD     Transcribed from ambient dictation for Ridge Hunter MD by Fabiola Garrison.  04/25/23   10:30 EDT    Patient or patient representative verbalized consent to the visit recording.  I have personally performed the services described in this document as transcribed by the above individual, and it is both accurate and complete.

## 2023-04-27 LAB — APO B SERPL-MCNC: 106 MG/DL

## 2023-04-30 DIAGNOSIS — E78.2 MIXED HYPERLIPIDEMIA: Primary | ICD-10-CM

## 2023-04-30 DIAGNOSIS — E11.65 TYPE 2 DIABETES MELLITUS WITH HYPERGLYCEMIA, WITHOUT LONG-TERM CURRENT USE OF INSULIN: ICD-10-CM

## 2023-04-30 RX ORDER — ROSUVASTATIN CALCIUM 20 MG/1
20 TABLET, COATED ORAL DAILY
Qty: 90 TABLET | Refills: 3 | Status: SHIPPED | OUTPATIENT
Start: 2023-04-30

## 2023-08-01 ENCOUNTER — LAB (OUTPATIENT)
Dept: LAB | Facility: HOSPITAL | Age: 68
End: 2023-08-01
Payer: MEDICARE

## 2023-08-01 ENCOUNTER — OFFICE VISIT (OUTPATIENT)
Dept: INTERNAL MEDICINE | Facility: CLINIC | Age: 68
End: 2023-08-01
Payer: MEDICARE

## 2023-08-01 VITALS
TEMPERATURE: 98 F | HEIGHT: 74 IN | SYSTOLIC BLOOD PRESSURE: 118 MMHG | BODY MASS INDEX: 32.24 KG/M2 | HEART RATE: 72 BPM | WEIGHT: 251.2 LBS | DIASTOLIC BLOOD PRESSURE: 84 MMHG

## 2023-08-01 DIAGNOSIS — E78.2 MIXED HYPERLIPIDEMIA: ICD-10-CM

## 2023-08-01 DIAGNOSIS — J45.20 MILD INTERMITTENT ASTHMA WITHOUT COMPLICATION: ICD-10-CM

## 2023-08-01 DIAGNOSIS — E11.65 TYPE 2 DIABETES MELLITUS WITH HYPERGLYCEMIA, WITHOUT LONG-TERM CURRENT USE OF INSULIN: ICD-10-CM

## 2023-08-01 DIAGNOSIS — F90.0 ATTENTION DEFICIT HYPERACTIVITY DISORDER, PREDOMINANTLY INATTENTIVE TYPE: ICD-10-CM

## 2023-08-01 DIAGNOSIS — E11.65 TYPE 2 DIABETES MELLITUS WITH HYPERGLYCEMIA, WITHOUT LONG-TERM CURRENT USE OF INSULIN: Primary | ICD-10-CM

## 2023-08-01 LAB — HBA1C MFR BLD: 5.7 % (ref 4.8–5.6)

## 2023-08-01 PROCEDURE — 1159F MED LIST DOCD IN RCRD: CPT | Performed by: INTERNAL MEDICINE

## 2023-08-01 PROCEDURE — 82172 ASSAY OF APOLIPOPROTEIN: CPT

## 2023-08-01 PROCEDURE — 3044F HG A1C LEVEL LT 7.0%: CPT | Performed by: INTERNAL MEDICINE

## 2023-08-01 PROCEDURE — 36415 COLL VENOUS BLD VENIPUNCTURE: CPT

## 2023-08-01 PROCEDURE — 80053 COMPREHEN METABOLIC PANEL: CPT

## 2023-08-01 PROCEDURE — 83036 HEMOGLOBIN GLYCOSYLATED A1C: CPT

## 2023-08-01 PROCEDURE — 80061 LIPID PANEL: CPT

## 2023-08-01 PROCEDURE — 99214 OFFICE O/P EST MOD 30 MIN: CPT | Performed by: INTERNAL MEDICINE

## 2023-08-01 PROCEDURE — 1160F RVW MEDS BY RX/DR IN RCRD: CPT | Performed by: INTERNAL MEDICINE

## 2023-08-01 RX ORDER — DEXTROAMPHETAMINE SACCHARATE, AMPHETAMINE ASPARTATE, DEXTROAMPHETAMINE SULFATE AND AMPHETAMINE SULFATE 2.5; 2.5; 2.5; 2.5 MG/1; MG/1; MG/1; MG/1
TABLET ORAL
Qty: 90 TABLET | Refills: 0 | Status: SHIPPED | OUTPATIENT
Start: 2023-08-01

## 2023-08-02 LAB
ALBUMIN SERPL-MCNC: 4.6 G/DL (ref 3.5–5.2)
ALBUMIN/GLOB SERPL: 1.8 G/DL
ALP SERPL-CCNC: 71 U/L (ref 39–117)
ALT SERPL W P-5'-P-CCNC: 17 U/L (ref 1–41)
ANION GAP SERPL CALCULATED.3IONS-SCNC: 12.2 MMOL/L (ref 5–15)
AST SERPL-CCNC: 38 U/L (ref 1–40)
BILIRUB SERPL-MCNC: 0.9 MG/DL (ref 0–1.2)
BUN SERPL-MCNC: 15 MG/DL (ref 8–23)
BUN/CREAT SERPL: 13.8 (ref 7–25)
CALCIUM SPEC-SCNC: 9.4 MG/DL (ref 8.6–10.5)
CHLORIDE SERPL-SCNC: 106 MMOL/L (ref 98–107)
CHOLEST SERPL-MCNC: 201 MG/DL (ref 0–200)
CO2 SERPL-SCNC: 22.8 MMOL/L (ref 22–29)
CREAT SERPL-MCNC: 1.09 MG/DL (ref 0.76–1.27)
EGFRCR SERPLBLD CKD-EPI 2021: 74.4 ML/MIN/1.73
GLOBULIN UR ELPH-MCNC: 2.5 GM/DL
GLUCOSE SERPL-MCNC: 100 MG/DL (ref 65–99)
HDLC SERPL-MCNC: 36 MG/DL (ref 40–60)
LDLC SERPL CALC-MCNC: 146 MG/DL (ref 0–100)
LDLC/HDLC SERPL: 4.02 {RATIO}
POTASSIUM SERPL-SCNC: 4.7 MMOL/L (ref 3.5–5.2)
PROT SERPL-MCNC: 7.1 G/DL (ref 6–8.5)
SODIUM SERPL-SCNC: 141 MMOL/L (ref 136–145)
TRIGL SERPL-MCNC: 102 MG/DL (ref 0–150)
VLDLC SERPL-MCNC: 19 MG/DL (ref 5–40)

## 2023-08-04 LAB — APO B SERPL-MCNC: 116 MG/DL

## 2023-08-06 RX ORDER — ROSUVASTATIN CALCIUM 40 MG/1
40 TABLET, COATED ORAL DAILY
Qty: 90 TABLET | Refills: 3 | Status: SHIPPED | OUTPATIENT
Start: 2023-08-06

## 2023-11-07 ENCOUNTER — OFFICE VISIT (OUTPATIENT)
Dept: INTERNAL MEDICINE | Facility: CLINIC | Age: 68
End: 2023-11-07
Payer: MEDICARE

## 2023-11-07 ENCOUNTER — LAB (OUTPATIENT)
Dept: LAB | Facility: HOSPITAL | Age: 68
End: 2023-11-07
Payer: MEDICARE

## 2023-11-07 VITALS
BODY MASS INDEX: 32.6 KG/M2 | SYSTOLIC BLOOD PRESSURE: 134 MMHG | HEIGHT: 74 IN | HEART RATE: 76 BPM | DIASTOLIC BLOOD PRESSURE: 72 MMHG | WEIGHT: 254 LBS | TEMPERATURE: 98.7 F

## 2023-11-07 DIAGNOSIS — Z00.00 ANNUAL PHYSICAL EXAM: Primary | ICD-10-CM

## 2023-11-07 DIAGNOSIS — F90.0 ATTENTION DEFICIT HYPERACTIVITY DISORDER, PREDOMINANTLY INATTENTIVE TYPE: ICD-10-CM

## 2023-11-07 DIAGNOSIS — E11.65 TYPE 2 DIABETES MELLITUS WITH HYPERGLYCEMIA, WITHOUT LONG-TERM CURRENT USE OF INSULIN: ICD-10-CM

## 2023-11-07 DIAGNOSIS — M1A.00X0 IDIOPATHIC CHRONIC GOUT WITHOUT TOPHUS, UNSPECIFIED SITE: ICD-10-CM

## 2023-11-07 DIAGNOSIS — Z12.5 PROSTATE CANCER SCREENING: ICD-10-CM

## 2023-11-07 DIAGNOSIS — E78.2 MIXED HYPERLIPIDEMIA: ICD-10-CM

## 2023-11-07 DIAGNOSIS — R53.83 OTHER FATIGUE: ICD-10-CM

## 2023-11-07 DIAGNOSIS — J45.20 MILD INTERMITTENT ASTHMA WITHOUT COMPLICATION: ICD-10-CM

## 2023-11-07 LAB
ALBUMIN SERPL-MCNC: 5 G/DL (ref 3.5–5.2)
ALBUMIN UR-MCNC: <1.2 MG/DL
ALBUMIN/GLOB SERPL: 2 G/DL
ALP SERPL-CCNC: 76 U/L (ref 39–117)
ALT SERPL W P-5'-P-CCNC: 24 U/L (ref 1–41)
ANION GAP SERPL CALCULATED.3IONS-SCNC: 11 MMOL/L (ref 5–15)
AST SERPL-CCNC: 28 U/L (ref 1–40)
BASOPHILS # BLD AUTO: 0.12 10*3/MM3 (ref 0–0.2)
BASOPHILS NFR BLD AUTO: 1.5 % (ref 0–1.5)
BILIRUB SERPL-MCNC: 0.8 MG/DL (ref 0–1.2)
BUN SERPL-MCNC: 13 MG/DL (ref 8–23)
BUN/CREAT SERPL: 11.8 (ref 7–25)
CALCIUM SPEC-SCNC: 9.9 MG/DL (ref 8.6–10.5)
CHLORIDE SERPL-SCNC: 105 MMOL/L (ref 98–107)
CHOLEST SERPL-MCNC: 143 MG/DL (ref 0–200)
CO2 SERPL-SCNC: 26 MMOL/L (ref 22–29)
CREAT SERPL-MCNC: 1.1 MG/DL (ref 0.76–1.27)
CREAT UR-MCNC: 150.7 MG/DL
DEPRECATED RDW RBC AUTO: 44.2 FL (ref 37–54)
EGFRCR SERPLBLD CKD-EPI 2021: 73.1 ML/MIN/1.73
EOSINOPHIL # BLD AUTO: 0.66 10*3/MM3 (ref 0–0.4)
EOSINOPHIL NFR BLD AUTO: 8.1 % (ref 0.3–6.2)
ERYTHROCYTE [DISTWIDTH] IN BLOOD BY AUTOMATED COUNT: 13.7 % (ref 12.3–15.4)
GLOBULIN UR ELPH-MCNC: 2.5 GM/DL
GLUCOSE SERPL-MCNC: 109 MG/DL (ref 65–99)
HBA1C MFR BLD: 6 % (ref 4.8–5.6)
HCT VFR BLD AUTO: 46.3 % (ref 37.5–51)
HDLC SERPL-MCNC: 45 MG/DL (ref 40–60)
HGB BLD-MCNC: 15.6 G/DL (ref 13–17.7)
IMM GRANULOCYTES # BLD AUTO: 0.02 10*3/MM3 (ref 0–0.05)
IMM GRANULOCYTES NFR BLD AUTO: 0.2 % (ref 0–0.5)
LDLC SERPL CALC-MCNC: 82 MG/DL (ref 0–100)
LDLC/HDLC SERPL: 1.81 {RATIO}
LYMPHOCYTES # BLD AUTO: 1.9 10*3/MM3 (ref 0.7–3.1)
LYMPHOCYTES NFR BLD AUTO: 23.2 % (ref 19.6–45.3)
MCH RBC QN AUTO: 29.4 PG (ref 26.6–33)
MCHC RBC AUTO-ENTMCNC: 33.7 G/DL (ref 31.5–35.7)
MCV RBC AUTO: 87.2 FL (ref 79–97)
MICROALBUMIN/CREAT UR: NORMAL MG/G{CREAT}
MONOCYTES # BLD AUTO: 0.79 10*3/MM3 (ref 0.1–0.9)
MONOCYTES NFR BLD AUTO: 9.7 % (ref 5–12)
NEUTROPHILS NFR BLD AUTO: 4.69 10*3/MM3 (ref 1.7–7)
NEUTROPHILS NFR BLD AUTO: 57.3 % (ref 42.7–76)
NRBC BLD AUTO-RTO: 0 /100 WBC (ref 0–0.2)
PLATELET # BLD AUTO: 299 10*3/MM3 (ref 140–450)
PMV BLD AUTO: 10.5 FL (ref 6–12)
POTASSIUM SERPL-SCNC: 4.5 MMOL/L (ref 3.5–5.2)
PROT SERPL-MCNC: 7.5 G/DL (ref 6–8.5)
PSA SERPL-MCNC: 0.31 NG/ML (ref 0–4)
RBC # BLD AUTO: 5.31 10*6/MM3 (ref 4.14–5.8)
SODIUM SERPL-SCNC: 142 MMOL/L (ref 136–145)
TRIGL SERPL-MCNC: 82 MG/DL (ref 0–150)
TSH SERPL DL<=0.05 MIU/L-ACNC: 3.97 UIU/ML (ref 0.27–4.2)
URATE SERPL-MCNC: 7.1 MG/DL (ref 3.4–7)
VIT B12 BLD-MCNC: 708 PG/ML (ref 211–946)
VLDLC SERPL-MCNC: 16 MG/DL (ref 5–40)
WBC NRBC COR # BLD: 8.18 10*3/MM3 (ref 3.4–10.8)

## 2023-11-07 PROCEDURE — 80053 COMPREHEN METABOLIC PANEL: CPT

## 2023-11-07 PROCEDURE — 80061 LIPID PANEL: CPT

## 2023-11-07 PROCEDURE — 82172 ASSAY OF APOLIPOPROTEIN: CPT

## 2023-11-07 PROCEDURE — 84443 ASSAY THYROID STIM HORMONE: CPT

## 2023-11-07 PROCEDURE — 82607 VITAMIN B-12: CPT

## 2023-11-07 PROCEDURE — 84550 ASSAY OF BLOOD/URIC ACID: CPT

## 2023-11-07 PROCEDURE — 85025 COMPLETE CBC W/AUTO DIFF WBC: CPT

## 2023-11-07 PROCEDURE — G0103 PSA SCREENING: HCPCS

## 2023-11-07 PROCEDURE — 36415 COLL VENOUS BLD VENIPUNCTURE: CPT

## 2023-11-07 PROCEDURE — 82043 UR ALBUMIN QUANTITATIVE: CPT

## 2023-11-07 PROCEDURE — 82570 ASSAY OF URINE CREATININE: CPT

## 2023-11-07 PROCEDURE — 83036 HEMOGLOBIN GLYCOSYLATED A1C: CPT

## 2023-11-07 RX ORDER — FLUTICASONE FUROATE AND VILANTEROL 200; 25 UG/1; UG/1
1 POWDER RESPIRATORY (INHALATION)
Qty: 60 EACH | Refills: 5 | Status: SHIPPED | OUTPATIENT
Start: 2023-11-07

## 2023-11-07 RX ORDER — DEXTROAMPHETAMINE SACCHARATE, AMPHETAMINE ASPARTATE, DEXTROAMPHETAMINE SULFATE AND AMPHETAMINE SULFATE 2.5; 2.5; 2.5; 2.5 MG/1; MG/1; MG/1; MG/1
TABLET ORAL
Qty: 90 TABLET | Refills: 0 | Status: SHIPPED | OUTPATIENT
Start: 2023-11-07

## 2023-11-07 NOTE — PROGRESS NOTES
QUICK REFERENCE INFORMATION:  The ABCs of the Annual Wellness Visit    Subsequent Medicare Wellness Visit    HEALTH RISK ASSESSMENT    1955    Recent Hospitalizations:  No hospitalization(s) within the last year..        Current Medical Providers:  Patient Care Team:  Ridge Hunter MD as PCP - General  Ridge Hunter MD as PCP - Family Medicine        Smoking Status:  Social History     Tobacco Use   Smoking Status Never   Smokeless Tobacco Never       Alcohol Consumption:  Social History     Substance and Sexual Activity   Alcohol Use No       Depression Screen:       2023    10:00 AM   PHQ-2/PHQ-9 Depression Screening   Little Interest or Pleasure in Doing Things 0-->not at all   Feeling Down, Depressed or Hopeless 0-->not at all   PHQ-9: Brief Depression Severity Measure Score 0       Health Habits and Functional and Cognitive Screenin/7/2023     9:56 AM   Functional & Cognitive Status   Do you have difficulty preparing food and eating? No   Do you have difficulty bathing yourself, getting dressed or grooming yourself? No   Do you have difficulty using the toilet? No   Do you have difficulty moving around from place to place? No   Do you have trouble with steps or getting out of a bed or a chair? No   Current Diet Well Balanced Diet   Dental Exam Not up to date   Eye Exam Not up to date   Exercise (times per week) 0 times per week   Current Exercises Include No Regular Exercise   Do you need help using the phone?  No   Are you deaf or do you have serious difficulty hearing?  No   Do you need help to go to places out of walking distance? No   Do you need help shopping? No   Do you need help preparing meals?  No   Do you need help with housework?  No   Do you need help with laundry? No   Do you need help taking your medications? No   Do you need help managing money? No   Do you ever drive or ride in a car without wearing a seat belt? No   Have you felt unusual stress, anger or  loneliness in the last month? No   Who do you live with? Alone   If you need help, do you have trouble finding someone available to you? No   Have you been bothered in the last four weeks by sexual problems? No   Do you have difficulty concentrating, remembering or making decisions? No       Fall Risk Screen:  DANUTA Fall Risk Assessment was completed, and patient is at LOW risk for falls.Assessment completed on:11/7/2023    ACE III MINI        Does the patient have evidence of cognitive impairment? No    Aspirin use counseling: Does not need ASA (and currently is not on it)    Recent Lab Results:  CMP:  Lab Results   Component Value Date    BUN 13 11/07/2023    CREATININE 1.10 11/07/2023    EGFRIFNONA 87 10/05/2021    EGFRIFAFRI 107 03/22/2021    BCR 11.8 11/07/2023     11/07/2023    K 4.5 11/07/2023    CO2 26.0 11/07/2023    CALCIUM 9.9 11/07/2023    PROTENTOTREF 6.7 03/22/2021    ALBUMIN 5.0 11/07/2023    LABGLOBREF 2.2 03/22/2021    LABIL2 2.0 03/22/2021    BILITOT 0.8 11/07/2023    ALKPHOS 76 11/07/2023    AST 28 11/07/2023    ALT 24 11/07/2023     HbA1c:  Lab Results   Component Value Date    HGBA1C 6.00 (H) 11/07/2023    HGBA1C 5.70 (H) 08/01/2023     Microalbumin:  Lab Results   Component Value Date    MICROALBUR <1.2 11/07/2023     Lipid Panel  Lab Results   Component Value Date    CHOL 143 11/07/2023    TRIG 82 11/07/2023    HDL 45 11/07/2023    LDL 82 11/07/2023    AST 28 11/07/2023    ALT 24 11/07/2023       Visual Acuity:  No results found.    Age-appropriate Screening Schedule:  Refer to the list below for future screening recommendations based on patient's age, sex and/or medical conditions. Orders for these recommended tests are listed in the plan section. The patient has been provided with a written plan.    Health Maintenance   Topic Date Due    ZOSTER VACCINE (1 of 2) Never done    COLORECTAL CANCER SCREENING  02/14/2011    TDAP/TD VACCINES (2 - Td or Tdap) 04/10/2019    DIABETIC EYE EXAM   07/30/2019    COVID-19 Vaccine (4 - 2023-24 season) 09/01/2023    ANNUAL WELLNESS VISIT  10/28/2023    BMI FOLLOWUP  10/28/2023    HEMOGLOBIN A1C  05/07/2024    DIABETIC FOOT EXAM  11/07/2024    LIPID PANEL  11/07/2024    URINE MICROALBUMIN  11/07/2024    HEPATITIS C SCREENING  Completed    INFLUENZA VACCINE  Completed    Pneumococcal Vaccine 65+  Completed        Subjective   History of Present Illness    Cody Sanchez is a 68 y.o. male who presents for a Subsequent Wellness Visit.    The patient is a 68-year-old male who presents today for a Subsequent Medicare Annual Wellness Examination and for follow-up of diabetes, hyperlipidemia, attention deficit disorder, asthma, and gout.    Asthma  The patient denies any problems with asthma this fall season. The cold turmeric has provided relief, and his strength and stamina are good. He will occasionally use his inhaler device early in the morning, which maintains him through out the day.     Hyperlipidemia  He is currently on rosuvastatin 20 mg and has been experiencing fatigue, muscle soreness, and pain. The other day he was able to feel a muscle as he could push and pull on it while feeling the pain. He feels muscle soreness first thing in the morning because he sleeps on his side and usually sleeps in an abnormal position. Other instances, he feels pain when he is lifting something with his bilateral upper extremities. On 11/07/2023, he was putting towels back into the closet and he could feel the pain in his right upper extremity.    Attention deficit disorder  He is still taking Adderall and is requesting a refill. Generally, he does not take it on Sundays.    Health maintenance  He admits he has not completed the Cologuard test. He would like to receive the influenza vaccine and pneumococcal vaccine.    CHRONIC CONDITIONS: Diabetes, hyperlipidemia, attention deficit disorder, asthma and gout.    The following portions of the patient's history were reviewed and  updated as appropriate: allergies, current medications, past family history, past medical history, past social history, past surgical history, and problem list.    Outpatient Medications Prior to Visit   Medication Sig Dispense Refill    albuterol sulfate  (90 Base) MCG/ACT inhaler Inhale 2 puffs by inhalation route every 4-6 hours as needed 1 g 5    B Complex Vitamins (VITAMIN-B COMPLEX PO) Take 1 tablet by mouth Daily.      BIOTIN PO Take 1 tablet by mouth Every Other Day.      Cholecalciferol (VITAMIN D3) 1000 units capsule Take 500 Units by mouth Every Other Day.      Magnesium Gluconate 550 MG tablet Take 1 tablet by mouth Every Other Day.      MULTIPLE VITAMIN PO Take 1 tablet by mouth Daily.      rosuvastatin (Crestor) 40 MG tablet Take 1 tablet by mouth Daily. 90 tablet 3    VITAMIN A PO Take 1 tablet by mouth Daily.      vitamin C (ASCORBIC ACID) 500 MG tablet Take 2 tablets by mouth Daily.      VITAMIN E COMPLEX PO Take 1 tablet by mouth Every Other Day.      Zinc 50 MG tablet Take 1 tablet by mouth Every Other Day.      amphetamine-dextroamphetamine (Adderall) 10 MG tablet Take 2 tablets in the morning, 1 tablet in the afternoon 90 tablet 0    Fluticasone Furoate-Vilanterol (Breo Ellipta) 200-25 MCG/ACT inhaler Inhale 1 puff Daily. 60 each 5     No facility-administered medications prior to visit.       Patient Active Problem List   Diagnosis    Annual physical exam    Gout, unspecified    Attention deficit hyperactivity disorder, predominantly inattentive type    Exacerbation of asthma    Obesity    Allergic rhinitis    Mixed hyperlipidemia    Type 2 diabetes mellitus with hyperglycemia, without long-term current use of insulin    Mild intermittent asthma without complication       Advance Care Planning:  ACP discussion was held with the patient during this visit. Patient has an advance directive (not in EMR), copy requested.    Identification of Risk Factors:  Risk factors include: Advance  Directive Discussion.    Review of Systems    A review of systems was performed, and positive findings are noted in the HPI.    Compared to one year ago, the patient feels his physical health is the same.  Compared to one year ago, the patient feels his mental health is the same.    Objective     Physical Exam  Vitals and nursing note reviewed.   Constitutional:       Appearance: He is well-developed. He is obese.   HENT:      Head: Normocephalic and atraumatic.      Right Ear: External ear normal.      Left Ear: External ear normal.      Nose: Nose normal.      Mouth/Throat:      Pharynx: No oropharyngeal exudate.   Eyes:      Conjunctiva/sclera: Conjunctivae normal.      Pupils: Pupils are equal, round, and reactive to light.   Neck:      Thyroid: No thyromegaly.      Vascular: No JVD.   Cardiovascular:      Rate and Rhythm: Normal rate and regular rhythm.      Pulses:           Dorsalis pedis pulses are 1+ on the right side and 1+ on the left side.      Heart sounds: Normal heart sounds. No murmur heard.     No friction rub. No gallop.   Pulmonary:      Effort: Pulmonary effort is normal. No respiratory distress.      Breath sounds: Normal breath sounds. No wheezing or rales.   Chest:      Chest wall: No tenderness.   Abdominal:      General: Bowel sounds are normal. There is no distension.      Palpations: Abdomen is soft. There is no mass.      Tenderness: There is no abdominal tenderness. There is no guarding or rebound.      Hernia: No hernia is present.   Musculoskeletal:         General: No tenderness. Normal range of motion.      Cervical back: Normal range of motion and neck supple.      Right foot: No deformity.      Left foot: No deformity.   Feet:      Right foot:      Protective Sensation: 5 sites tested.  5 sites sensed.      Skin integrity: Skin integrity normal.      Left foot:      Protective Sensation: 5 sites tested.  5 sites sensed.      Skin integrity: Skin integrity normal.      Comments:  "Sensation in both feet intact to monofilament.     Diabetic Foot Exam Performed and Monofilament Test Performed    Lymphadenopathy:      Cervical: No cervical adenopathy.   Skin:     General: Skin is warm and dry.      Findings: No erythema or rash.   Neurological:      Mental Status: He is alert and oriented to person, place, and time.      Cranial Nerves: No cranial nerve deficit.      Sensory: No sensory deficit.      Motor: No abnormal muscle tone.      Coordination: Coordination normal.      Deep Tendon Reflexes: Reflexes normal.   Psychiatric:         Behavior: Behavior normal.         Thought Content: Thought content normal.         Judgment: Judgment normal.          Procedures     Vitals:    11/07/23 1002   BP: 134/72   BP Location: Left arm   Patient Position: Sitting   Cuff Size: Adult   Pulse: 76   Temp: 98.7 °F (37.1 °C)   Weight: 115 kg (254 lb)   Height: 187.7 cm (73.9\")   PainSc:   4   PainLoc: Shoulder  Comment: bilateral       BMI is >= 30 and <35. (Class 1 Obesity). The following options were offered after discussion;: exercise counseling/recommendations and nutrition counseling/recommendations      Assessment & Plan   Problem List Items Addressed This Visit          Cardiac and Vasculature    Mixed hyperlipidemia    Relevant Medications    rosuvastatin (Crestor) 40 MG tablet    Other Relevant Orders    Lipid Panel (Completed)    Apolipoprotein B (Completed)       Endocrine and Metabolic    Type 2 diabetes mellitus with hyperglycemia, without long-term current use of insulin    Relevant Orders    Comprehensive Metabolic Panel (Completed)    Hemoglobin A1c (Completed)    Microalbumin / Creatinine Urine Ratio - Urine, Clean Catch (Completed)       Health Encounters    Annual physical exam - Primary       Mental Health    Attention deficit hyperactivity disorder, predominantly inattentive type    Relevant Medications    amphetamine-dextroamphetamine (Adderall) 10 MG tablet       Musculoskeletal and " Injuries    Gout, unspecified    Relevant Orders    Uric Acid (Completed)       Pulmonary and Pneumonias    Mild intermittent asthma without complication    Relevant Medications    albuterol sulfate  (90 Base) MCG/ACT inhaler    Fluticasone Furoate-Vilanterol (Breo Ellipta) 200-25 MCG/ACT inhaler     Other Visit Diagnoses       Prostate cancer screening        Relevant Orders    PSA Screen (Completed)    Other fatigue        Relevant Orders    CBC & Differential (Completed)    TSH (Completed)    Vitamin B12 (Completed)          Patient Self-Management and Personalized Health Advice  The patient has been provided with information about: diet, exercise, and weight management and preventive services including:   Annual Wellness Visit (AWV).    Outpatient Encounter Medications as of 11/7/2023   Medication Sig Dispense Refill    albuterol sulfate  (90 Base) MCG/ACT inhaler Inhale 2 puffs by inhalation route every 4-6 hours as needed 1 g 5    amphetamine-dextroamphetamine (Adderall) 10 MG tablet Take 2 tablets in the morning, 1 tablet in the afternoon 90 tablet 0    B Complex Vitamins (VITAMIN-B COMPLEX PO) Take 1 tablet by mouth Daily.      BIOTIN PO Take 1 tablet by mouth Every Other Day.      Cholecalciferol (VITAMIN D3) 1000 units capsule Take 500 Units by mouth Every Other Day.      Fluticasone Furoate-Vilanterol (Breo Ellipta) 200-25 MCG/ACT inhaler Inhale 1 puff Daily. 60 each 5    Magnesium Gluconate 550 MG tablet Take 1 tablet by mouth Every Other Day.      MULTIPLE VITAMIN PO Take 1 tablet by mouth Daily.      rosuvastatin (Crestor) 40 MG tablet Take 1 tablet by mouth Daily. 90 tablet 3    VITAMIN A PO Take 1 tablet by mouth Daily.      vitamin C (ASCORBIC ACID) 500 MG tablet Take 2 tablets by mouth Daily.      VITAMIN E COMPLEX PO Take 1 tablet by mouth Every Other Day.      Zinc 50 MG tablet Take 1 tablet by mouth Every Other Day.      [DISCONTINUED] amphetamine-dextroamphetamine (Adderall) 10 MG  tablet Take 2 tablets in the morning, 1 tablet in the afternoon 90 tablet 0    [DISCONTINUED] Fluticasone Furoate-Vilanterol (Breo Ellipta) 200-25 MCG/ACT inhaler Inhale 1 puff Daily. 60 each 5     No facility-administered encounter medications on file as of 11/7/2023.       Reviewed use of high risk medication in the elderly: yes  Reviewed for potential of harmful drug interactions in the elderly: yes    Prevention  - Overall, he remains in good health, but he must improve fitness and strength with regular exercise. He will receive the Prevnar 20 vaccine and influenza vaccine today. He will consider COVID-19 and RSV later this fall.    Diabetes  - Hemoglobin A1c is pending. He will continue metformin and efforts at a healthy diet. A foot examination was performed today. He is reminded he is due for an ocular examination.    Hyperlipidemia  - Lipid panel is pending. He is concerned that his fatigue may be secondary to rosuvastatin. He may discontinue this for 1 month and monitor if it improves.    Attention deficit disorder  - He continues to have a good response to Adderall.    Asthma  - Asthma is well controlled with Breo Ellipta.    Gout  - Gout is currently asymptomatic.    Follow-up in 3 months.    Follow Up:  Return in about 3 months (around 2/7/2024) for follow up.     There are no Patient Instructions on file for this visit.    An After Visit Summary and PPPS with all of these plans were given to the patient.         Transcribed from ambient dictation for Ridge Hunter MD by Shreya Kerr  11/07/23   12:26 EST    Patient or patient representative verbalized consent to the visit recording.  I have personally performed the services described in this document as transcribed by the above individual, and it is both accurate and complete.

## 2023-11-07 NOTE — LETTER
[facility]  Vaccine Consent Form    Patient Name:  Cody Sanchez  Patient :  1955     Vaccine(s) Ordered    Fluzone High-Dose 65+yrs  Pneumococcal Conjugate Vaccine 20-Valent All        Screening Checklist  The following questions should be completed prior to vaccination. If you answer “yes” to any question, it does not necessarily mean you should not be vaccinated. It just means we may need to clarify or ask more questions. If a question is unclear, please ask your healthcare provider to explain it.    Yes No   Any fever or moderate to severe illness today (mild illness and/or antibiotic treatment are not contraindications)?     Do you have a history of a serious reaction to any previous vaccinations, such as anaphylaxis, encephalopathy within 7 days, Guillain-[place] syndrome within 6 weeks, seizure?     Have you received any live vaccine(s) in the past month (MMR, TORO)?     Do you have an anaphylactic allergy to latex (DTaP, DTaP-IPV, Hep A, Hep B, MenB, RV, Td, Tdap), baker’s yeast (Hep B, HPV), or gelatin (TORO, MMR)?     Do you have an anaphylactic allergy to neomycin (Rabies, TORO, MMR, IPV, Hep A), polymyxin B (IPV), or streptomycin (IPV)?      Any cancer, leukemia, AIDS, or other immune system disorder? (TORO, MMR, RV)     Do you have a parent, brother, or sister with an immune system problem (if immune competence of vaccine recipient clinically verified, can proceed)? (MMR, TORO)     Any recent steroid treatments for >2 weeks, chemotherapy, or radiation treatment? (TORO, MMR)     Have you received antibody-containing blood transfusions or IVIG in the past 11 months (recommended interval is dependent on product)? (MMR, TORO)     Have you taken antiviral drugs (acyclovir, famciclovir, valacyclovir) in the last 24 or 48 hours, respectively (TORO)?      Are you pregnant or planning to become pregnant within 1 month? (TORO, MMR, HPV, IPV, MenB; For hep B- refer to Engerix-B)     For infants, have you ever  been told your child has had intussusception or a medical emergency involving obstruction of the intestine (RV)? If not for an infant, can skip this question.         *Ordering Physician/APC should be consulted if “yes” is checked by the patient or guardian above.      I have received, read, and understand the Vaccine Information Statement (VIS) for each vaccine ordered above.  I have considered my health status as well as the health status of my close contacts.  I have taken the opportunity to discuss my vaccine questions with my health care provider.   I have requested that the ordered vaccine(s) be given to me.  I understand the benefits and risks of the vaccines.  I understand that I should remain in the clinic for 15 minutes after receiving the vaccine(s).  _________________________________________________________  Signature of Patient or Parent/Legal Guardian ____________________  Date

## 2023-11-09 LAB — APO B SERPL-MCNC: 76 MG/DL

## 2024-02-02 DIAGNOSIS — F90.0 ATTENTION DEFICIT HYPERACTIVITY DISORDER, PREDOMINANTLY INATTENTIVE TYPE: ICD-10-CM

## 2024-02-02 NOTE — TELEPHONE ENCOUNTER
"Caller: Cody Sanchez \"Joseluis\"    Relationship: Self    Best call back number: [phone]     Requested Prescriptions:   Requested Prescriptions     Pending Prescriptions Disp Refills    amphetamine-dextroamphetamine (Adderall) 10 MG tablet 90 tablet 0     Sig: Take 2 tablets in the morning, 1 tablet in the afternoon        Pharmacy where request should be sent: [facility] [address] [place] [address] [address] [place] [address] - [phone] [facility] [phone]      Last office visit with prescribing clinician: 11/7/2023   Last telemedicine visit with prescribing clinician: Visit date not found   Next office visit with prescribing clinician: 2/8/2024     Additional details provided by patient: PATIENT HAS ONE DAY LEFT.     Does the patient have less than a 3 day supply:  [x] Yes  [] No    Would you like a call back once the refill request has been completed: [] Yes [x] No    If the office needs to give you a call back, can they leave a voicemail: [] Yes [x] No    Cadance Dunaway, RegSched Rep   02/02/24 15:23 EST           "

## 2024-02-05 RX ORDER — DEXTROAMPHETAMINE SACCHARATE, AMPHETAMINE ASPARTATE, DEXTROAMPHETAMINE SULFATE AND AMPHETAMINE SULFATE 2.5; 2.5; 2.5; 2.5 MG/1; MG/1; MG/1; MG/1
TABLET ORAL
Qty: 90 TABLET | Refills: 0 | Status: SHIPPED | OUTPATIENT
Start: 2024-02-05

## 2024-02-08 ENCOUNTER — OFFICE VISIT (OUTPATIENT)
Dept: INTERNAL MEDICINE | Facility: CLINIC | Age: 69
End: 2024-02-08
Payer: MEDICARE

## 2024-02-08 VITALS
HEIGHT: 74 IN | DIASTOLIC BLOOD PRESSURE: 78 MMHG | OXYGEN SATURATION: 93 % | BODY MASS INDEX: 33.91 KG/M2 | TEMPERATURE: 97.2 F | HEART RATE: 70 BPM | WEIGHT: 264.2 LBS | SYSTOLIC BLOOD PRESSURE: 120 MMHG

## 2024-02-08 DIAGNOSIS — F90.0 ATTENTION DEFICIT HYPERACTIVITY DISORDER, PREDOMINANTLY INATTENTIVE TYPE: ICD-10-CM

## 2024-02-08 DIAGNOSIS — J33.9 NASAL POLYP: ICD-10-CM

## 2024-02-08 DIAGNOSIS — E11.65 TYPE 2 DIABETES MELLITUS WITH HYPERGLYCEMIA, WITHOUT LONG-TERM CURRENT USE OF INSULIN: Primary | ICD-10-CM

## 2024-02-08 DIAGNOSIS — J45.20 MILD INTERMITTENT ASTHMA WITHOUT COMPLICATION: ICD-10-CM

## 2024-02-08 LAB
EXPIRATION DATE: ABNORMAL
HBA1C MFR BLD: 5.9 % (ref 4.5–5.7)
Lab: ABNORMAL

## 2024-02-08 RX ORDER — SACCHAROMYCES BOULARDII 250 MG
250 CAPSULE ORAL 2 TIMES DAILY
COMMUNITY

## 2024-02-08 RX ORDER — ROSUVASTATIN CALCIUM 10 MG/1
10 TABLET, COATED ORAL DAILY
Qty: 90 TABLET | Refills: 3 | Status: SHIPPED | OUTPATIENT
Start: 2024-02-08

## 2024-02-08 NOTE — PROGRESS NOTES
[facility]     Cody Sanchez  1955   [id number]      Patient Care Team:  Ridge Hunter MD as PCP - General  Ridge Hunter MD as PCP - Family Medicine    Chief Complaint::   Chief Complaint   Patient presents with    Diabetes        HPI  The patient is seen for follow-up of diabetes, attention deficit disorder, and asthma.    Asthma  His asthma is stable but is affected by weather changes. He is on Breo and albuterol.     Weight gain  He has gained 20 pounds since 04/2023 and about 10 pounds in the last 6 weeks.     Sebaceous cyst  The patient scratched an area on his back against a door which he believed to be a pimple or ingrown hair. The area swelled and burst. It has almost subsided now, and he noticed pus drainage from it while sleeping at night. He usually sleeps on his back but will turn over to his side.     Attention deficit disorder.  He is doing well on Adderall.    Hyperlipidemia  The patient discontinued his cholesterol medication as it was causing fatigue. He is aware of its benefits. He inquires if the dose of the cholesterol medication can be tapered down from 20 mg to 10 mg.     Skin tag  The patient has a skin tag in his left nostril which bothers him.     Chronic Conditions:      Patient Active Problem List   Diagnosis    Annual physical exam    Gout, unspecified    Attention deficit hyperactivity disorder, predominantly inattentive type    Exacerbation of asthma    Obesity    Allergic rhinitis    Mixed hyperlipidemia    Type 2 diabetes mellitus with hyperglycemia, without long-term current use of insulin    Mild intermittent asthma without complication        Past Medical History:   Diagnosis Date    Deep vein thrombosis        Past Surgical History:   Procedure Laterality Date    TONSILLECTOMY         Family History   Problem Relation Age of Onset    Hypertension Mother     Coronary artery disease Mother     Hypertension Father     Coronary artery disease  Father     Cancer Brother         PROSTATE CANCER        Social History     Socioeconomic History    Marital status:    Tobacco Use    Smoking status: Never    Smokeless tobacco: Never   Substance and Sexual Activity    Alcohol use: No    Drug use: No       No Known Allergies      Current Outpatient Medications:     albuterol sulfate  (90 Base) MCG/ACT inhaler, Inhale 2 puffs by inhalation route every 4-6 hours as needed, Disp: 1 g, Rfl: 5    amphetamine-dextroamphetamine (Adderall) 10 MG tablet, Take 2 tablets in the morning, 1 tablet in the afternoon, Disp: 90 tablet, Rfl: 0    B Complex Vitamins (VITAMIN-B COMPLEX PO), Take 1 tablet by mouth Daily., Disp: , Rfl:     BIOTIN PO, Take 1 tablet by mouth Every Other Day., Disp: , Rfl:     Cholecalciferol (VITAMIN D3) 1000 units capsule, Take 500 Units by mouth Every Other Day., Disp: , Rfl:     Fluticasone Furoate-Vilanterol (Breo Ellipta) 200-25 MCG/ACT inhaler, Inhale 1 puff Daily., Disp: 60 each, Rfl: 5    Magnesium Gluconate 550 MG tablet, Take 1 tablet by mouth Every Other Day., Disp: , Rfl:     MULTIPLE VITAMIN PO, Take 1 tablet by mouth Daily., Disp: , Rfl:     rosuvastatin (Crestor) 10 MG tablet, Take 1 tablet by mouth Daily., Disp: 90 tablet, Rfl: 3    saccharomyces boulardii (FLORASTOR) 250 MG capsule, Take 1 capsule by mouth 2 (Two) Times a Day., Disp: , Rfl:     VITAMIN A PO, Take 1 tablet by mouth Daily., Disp: , Rfl:     vitamin C (ASCORBIC ACID) 500 MG tablet, Take 2 tablets by mouth Daily., Disp: , Rfl:     VITAMIN E COMPLEX PO, Take 1 tablet by mouth Every Other Day., Disp: , Rfl:     Zinc 50 MG tablet, Take 1 tablet by mouth Every Other Day., Disp: , Rfl:     Review of Systems   Constitutional: Negative.    Respiratory: Negative.  Negative for chest tightness and shortness of breath.    Cardiovascular: Negative.  Negative for chest pain.   Gastrointestinal:  Negative for abdominal pain, blood in stool, constipation and diarrhea.     "    Vital Signs  Vitals:    02/08/24 0755   BP: 120/78   BP Location: Right arm   Patient Position: Sitting   Cuff Size: Adult   Pulse: 70   Temp: 97.2 °F (36.2 °C)   TempSrc: Infrared   SpO2: 93%   Weight: 120 kg (264 lb 3.2 oz)   Height: 187.7 cm (73.9\")   PainSc: 0-No pain       Physical Exam  Vitals reviewed.   Constitutional:       Appearance: He is well-developed.   HENT:      Head: Normocephalic and atraumatic.      Nose:      Comments: There is a skin tag/polyp in the left nares along the septum.  Neck:      Thyroid: No thyromegaly.      Vascular: No carotid bruit.   Cardiovascular:      Rate and Rhythm: Normal rate and regular rhythm.      Heart sounds: Normal heart sounds. No murmur heard.     No friction rub. No gallop.   Pulmonary:      Effort: Pulmonary effort is normal.      Breath sounds: Normal breath sounds.   Musculoskeletal:      Cervical back: Normal range of motion and neck supple.      Right lower leg: No edema.      Left lower leg: No edema.   Skin:     Comments: There is 3x3 cm area, mid back, with central crust, erythematous.  Appearance is of a sebaceous cyst which burst and drained. No fluctuance.           Procedures    ACE III MINI             Assessment/Plan:    Diagnoses and all orders for this visit:    1. Type 2 diabetes mellitus with hyperglycemia, without long-term current use of insulin (Primary)  -     POC Glycosylated Hemoglobin (Hb A1C)    2. Attention deficit hyperactivity disorder, predominantly inattentive type    3. Mild intermittent asthma without complication    4. Nasal polyp  -     Ambulatory Referral to ENT (Otolaryngology)    Other orders  -     rosuvastatin (Crestor) 10 MG tablet; Take 1 tablet by mouth Daily.  Dispense: 90 tablet; Refill: 3        1. Diabetes  - Hemoglobin A1c is pending which will be obtained today. He has gained 20 pounds over the past year. Up until now, he has been able to control his glucose with lifestyle changes. We will obtain fasting labs " in the next visit. I tapered down the dose of cholesterol medication to 10 mg and reviewed its side effect as muscle soreness.     2. Attention deficit disorder  - This remains well controlled on Adderall.    3. Asthma  - Asthma is controlled on Breo and albuterol as needed.    4. Nasal polyp  - It is likely a skin tag. Referral provided to ENT specialist.    5. Sebaceous cyst  - The lesion was probably a sebaceous cyst which got infected. It is unlikely a skin cancer. It looks like this is already drained and has a scab on it which is almost flat. It was probably infected at one point. No specific treatment is required now, but if the lesion recurs, we will refer to General Surgery at the next visit for resection.    Follow up in 3 months.    Plan of care reviewed with patient at the conclusion of today's visit. Education was provided regarding diagnosis, management, and any prescribed or recommended OTC medications.Patient verbalizes understanding of and agreement with management plan.         Ridge Hunter MD       .[email or web address]  Catie Arce.

## 2024-05-08 ENCOUNTER — LAB (OUTPATIENT)
Dept: LAB | Facility: HOSPITAL | Age: 69
End: 2024-05-08
Payer: MEDICARE

## 2024-05-08 ENCOUNTER — OFFICE VISIT (OUTPATIENT)
Dept: INTERNAL MEDICINE | Facility: CLINIC | Age: 69
End: 2024-05-08
Payer: MEDICARE

## 2024-05-08 VITALS
HEIGHT: 74 IN | TEMPERATURE: 98.2 F | SYSTOLIC BLOOD PRESSURE: 132 MMHG | BODY MASS INDEX: 32.83 KG/M2 | DIASTOLIC BLOOD PRESSURE: 76 MMHG | OXYGEN SATURATION: 98 % | HEART RATE: 64 BPM | WEIGHT: 255.8 LBS

## 2024-05-08 DIAGNOSIS — F90.0 ATTENTION DEFICIT HYPERACTIVITY DISORDER, PREDOMINANTLY INATTENTIVE TYPE: ICD-10-CM

## 2024-05-08 DIAGNOSIS — L72.3 SEBACEOUS CYST: ICD-10-CM

## 2024-05-08 DIAGNOSIS — E11.65 TYPE 2 DIABETES MELLITUS WITH HYPERGLYCEMIA, WITHOUT LONG-TERM CURRENT USE OF INSULIN: ICD-10-CM

## 2024-05-08 DIAGNOSIS — E78.2 MIXED HYPERLIPIDEMIA: ICD-10-CM

## 2024-05-08 DIAGNOSIS — E11.65 TYPE 2 DIABETES MELLITUS WITH HYPERGLYCEMIA, WITHOUT LONG-TERM CURRENT USE OF INSULIN: Primary | ICD-10-CM

## 2024-05-08 DIAGNOSIS — H53.8 BLURRED VISION: ICD-10-CM

## 2024-05-08 LAB
ALBUMIN SERPL-MCNC: 4.8 G/DL (ref 3.5–5.2)
ALBUMIN/GLOB SERPL: 1.8 G/DL
ALP SERPL-CCNC: 74 U/L (ref 39–117)
ALT SERPL W P-5'-P-CCNC: 18 U/L (ref 1–41)
ANION GAP SERPL CALCULATED.3IONS-SCNC: 10 MMOL/L (ref 5–15)
AST SERPL-CCNC: 22 U/L (ref 1–40)
BILIRUB SERPL-MCNC: 0.9 MG/DL (ref 0–1.2)
BUN SERPL-MCNC: 14 MG/DL (ref 8–23)
BUN/CREAT SERPL: 13.6 (ref 7–25)
CALCIUM SPEC-SCNC: 9.5 MG/DL (ref 8.6–10.5)
CHLORIDE SERPL-SCNC: 105 MMOL/L (ref 98–107)
CHOLEST SERPL-MCNC: 199 MG/DL (ref 0–200)
CO2 SERPL-SCNC: 25 MMOL/L (ref 22–29)
CREAT SERPL-MCNC: 1.03 MG/DL (ref 0.76–1.27)
EGFRCR SERPLBLD CKD-EPI 2021: 79.1 ML/MIN/1.73
GLOBULIN UR ELPH-MCNC: 2.6 GM/DL
GLUCOSE SERPL-MCNC: 101 MG/DL (ref 65–99)
HBA1C MFR BLD: 6.2 % (ref 4.8–5.6)
HDLC SERPL-MCNC: 37 MG/DL (ref 40–60)
LDLC SERPL CALC-MCNC: 137 MG/DL (ref 0–100)
LDLC/HDLC SERPL: 3.64 {RATIO}
POTASSIUM SERPL-SCNC: 4.5 MMOL/L (ref 3.5–5.2)
PROT SERPL-MCNC: 7.4 G/DL (ref 6–8.5)
SODIUM SERPL-SCNC: 140 MMOL/L (ref 136–145)
TRIGL SERPL-MCNC: 137 MG/DL (ref 0–150)
VLDLC SERPL-MCNC: 25 MG/DL (ref 5–40)

## 2024-05-08 PROCEDURE — 80053 COMPREHEN METABOLIC PANEL: CPT

## 2024-05-08 PROCEDURE — 80061 LIPID PANEL: CPT

## 2024-05-08 PROCEDURE — 1126F AMNT PAIN NOTED NONE PRSNT: CPT | Performed by: INTERNAL MEDICINE

## 2024-05-08 PROCEDURE — 99214 OFFICE O/P EST MOD 30 MIN: CPT | Performed by: INTERNAL MEDICINE

## 2024-05-08 PROCEDURE — 3044F HG A1C LEVEL LT 7.0%: CPT | Performed by: INTERNAL MEDICINE

## 2024-05-08 PROCEDURE — 36415 COLL VENOUS BLD VENIPUNCTURE: CPT

## 2024-05-08 PROCEDURE — 1160F RVW MEDS BY RX/DR IN RCRD: CPT | Performed by: INTERNAL MEDICINE

## 2024-05-08 PROCEDURE — 1159F MED LIST DOCD IN RCRD: CPT | Performed by: INTERNAL MEDICINE

## 2024-05-08 PROCEDURE — 82172 ASSAY OF APOLIPOPROTEIN: CPT

## 2024-05-08 PROCEDURE — G2211 COMPLEX E/M VISIT ADD ON: HCPCS | Performed by: INTERNAL MEDICINE

## 2024-05-08 PROCEDURE — 83036 HEMOGLOBIN GLYCOSYLATED A1C: CPT

## 2024-05-08 RX ORDER — DEXTROAMPHETAMINE SACCHARATE, AMPHETAMINE ASPARTATE, DEXTROAMPHETAMINE SULFATE AND AMPHETAMINE SULFATE 2.5; 2.5; 2.5; 2.5 MG/1; MG/1; MG/1; MG/1
TABLET ORAL
Qty: 90 TABLET | Refills: 0 | Status: SHIPPED | OUTPATIENT
Start: 2024-05-08

## 2024-05-08 RX ORDER — SULFAMETHOXAZOLE AND TRIMETHOPRIM 800; 160 MG/1; MG/1
1 TABLET ORAL 2 TIMES DAILY
Qty: 14 TABLET | Refills: 0 | Status: SHIPPED | OUTPATIENT
Start: 2024-05-08

## 2024-05-11 LAB — APO B SERPL-MCNC: 122 MG/DL

## 2024-08-13 DIAGNOSIS — F90.0 ATTENTION DEFICIT HYPERACTIVITY DISORDER, PREDOMINANTLY INATTENTIVE TYPE: ICD-10-CM

## 2024-08-13 NOTE — TELEPHONE ENCOUNTER
"    Caller: Cody Sanchez \"Joseluis\"    Relationship: Self    Best call back number: [phone]     Requested Prescriptions:   Requested Prescriptions     Pending Prescriptions Disp Refills    amphetamine-dextroamphetamine (Adderall) 10 MG tablet 90 tablet 0     Sig: Take 2 tablets in the morning, 1 tablet in the afternoon        Pharmacy where request should be sent: [facility] [address] [place] [address] [address] [place] [address] - [phone]  - [phone] FX     Last office visit with prescribing clinician: 5/8/2024   Last telemedicine visit with prescribing clinician: Visit date not found   Next office visit with prescribing clinician: 8/20/2024     Additional details provided by patient:     Does the patient have less than a 3 day supply:  [] Yes  [x] No    Would you like a call back once the refill request has been completed: [] Yes [x] No    If the office needs to give you a call back, can they leave a voicemail: [] Yes [x] No    Luis Alberto Lin   08/13/24 14:17 EDT     "

## 2024-08-13 NOTE — TELEPHONE ENCOUNTER
Rx Refill Note  Requested Prescriptions     Pending Prescriptions Disp Refills    amphetamine-dextroamphetamine (Adderall) 10 MG tablet 90 tablet 0     Sig: Take 2 tablets in the morning, 1 tablet in the afternoon      Last office visit with prescribing clinician: 5/8/2024   Next office visit with prescribing clinician: 8/20/2024     LA: 05/08/24 #90 0R                          Would you like a call back once the refill request has been completed: [] Yes [] No    If the office needs to give you a call back, can they leave a voicemail: [] Yes [] No    Sarah Mathew LPN  08/13/24, 14:31 EDT

## 2024-08-14 RX ORDER — DEXTROAMPHETAMINE SACCHARATE, AMPHETAMINE ASPARTATE, DEXTROAMPHETAMINE SULFATE AND AMPHETAMINE SULFATE 2.5; 2.5; 2.5; 2.5 MG/1; MG/1; MG/1; MG/1
TABLET ORAL
Qty: 90 TABLET | Refills: 0 | Status: SHIPPED | OUTPATIENT
Start: 2024-08-14

## 2024-08-28 ENCOUNTER — OFFICE VISIT (OUTPATIENT)
Dept: INTERNAL MEDICINE | Facility: CLINIC | Age: 69
End: 2024-08-28
Payer: MEDICARE

## 2024-08-28 VITALS
BODY MASS INDEX: 31.24 KG/M2 | HEIGHT: 74 IN | WEIGHT: 243.4 LBS | TEMPERATURE: 97.5 F | HEART RATE: 73 BPM | OXYGEN SATURATION: 96 % | SYSTOLIC BLOOD PRESSURE: 134 MMHG | DIASTOLIC BLOOD PRESSURE: 102 MMHG

## 2024-08-28 DIAGNOSIS — L82.0 INFLAMED SEBORRHEIC KERATOSIS: ICD-10-CM

## 2024-08-28 DIAGNOSIS — J45.20 MILD INTERMITTENT ASTHMA WITHOUT COMPLICATION: ICD-10-CM

## 2024-08-28 DIAGNOSIS — F90.0 ATTENTION DEFICIT HYPERACTIVITY DISORDER, PREDOMINANTLY INATTENTIVE TYPE: ICD-10-CM

## 2024-08-28 DIAGNOSIS — E11.65 TYPE 2 DIABETES MELLITUS WITH HYPERGLYCEMIA, WITHOUT LONG-TERM CURRENT USE OF INSULIN: Primary | ICD-10-CM

## 2024-08-28 LAB
EXPIRATION DATE: ABNORMAL
HBA1C MFR BLD: 5.8 % (ref 4.5–5.7)
Lab: ABNORMAL

## 2024-08-28 PROCEDURE — 99214 OFFICE O/P EST MOD 30 MIN: CPT | Performed by: INTERNAL MEDICINE

## 2024-08-28 PROCEDURE — 1160F RVW MEDS BY RX/DR IN RCRD: CPT | Performed by: INTERNAL MEDICINE

## 2024-08-28 PROCEDURE — 17110 DESTRUCTION B9 LES UP TO 14: CPT | Performed by: INTERNAL MEDICINE

## 2024-08-28 PROCEDURE — 1126F AMNT PAIN NOTED NONE PRSNT: CPT | Performed by: INTERNAL MEDICINE

## 2024-08-28 PROCEDURE — 83036 HEMOGLOBIN GLYCOSYLATED A1C: CPT | Performed by: INTERNAL MEDICINE

## 2024-08-28 PROCEDURE — 3044F HG A1C LEVEL LT 7.0%: CPT | Performed by: INTERNAL MEDICINE

## 2024-08-28 PROCEDURE — 1159F MED LIST DOCD IN RCRD: CPT | Performed by: INTERNAL MEDICINE

## 2024-08-28 NOTE — PROGRESS NOTES
[facility]     Cody Sanchez  1955   [id number]      Patient Care Team:  Ridge Hunter MD as PCP - General  Ridge Hunter MD as PCP - Family Medicine    Chief Complaint::   Chief Complaint   Patient presents with    ADHD    Diabetes        HPI  History of Present Illness  The patient is a 68-year-old male who comes in for follow-up of diabetes, attention deficit disorder, and asthma.    He reports a decrease in his blood pressure, which was elevated to 150/92 during a recent blood donation. He also mentions experiencing some anxiety today. He has an automated blood pressure monitor at home but is unsure of how to operate it.    His asthma is currently under control.    He has not yet collected his Adderall prescription due to work commitments.      Chronic Conditions:      Patient Active Problem List   Diagnosis    Annual physical exam    Gout, unspecified    Attention deficit hyperactivity disorder, predominantly inattentive type    Exacerbation of asthma    Obesity    Allergic rhinitis    Mixed hyperlipidemia    Type 2 diabetes mellitus with hyperglycemia, without long-term current use of insulin    Mild intermittent asthma without complication        Past Medical History:   Diagnosis Date    ADHD (attention deficit hyperactivity disorder)     taking adderall per MD    Allergic     [place] resident, started childhood    Asthma     sometimes during allergy seasons    Cataract recent    being monitored    Deep vein thrombosis        Past Surgical History:   Procedure Laterality Date    COLONOSCOPY      done 15yrs or so ago?    TONSILLECTOMY         Family History   Problem Relation Age of Onset    Hypertension Mother     Coronary artery disease Mother     Hypertension Father     Coronary artery disease Father     Cancer Brother         PROSTATE CANCER        Social History     Socioeconomic History    Marital status:    Tobacco Use    Smoking status: Never    Smokeless  tobacco: Never   Substance and Sexual Activity    Alcohol use: No    Drug use: No       No Known Allergies      Current Outpatient Medications:     albuterol sulfate  (90 Base) MCG/ACT inhaler, Inhale 2 puffs by inhalation route every 4-6 hours as needed, Disp: 1 g, Rfl: 5    amphetamine-dextroamphetamine (Adderall) 10 MG tablet, Take 2 tablets in the morning, 1 tablet in the afternoon, Disp: 90 tablet, Rfl: 0    B Complex Vitamins (VITAMIN-B COMPLEX PO), Take 1 tablet by mouth Daily., Disp: , Rfl:     BIOTIN PO, Take 1 tablet by mouth Every Other Day., Disp: , Rfl:     Cholecalciferol (VITAMIN D3) 1000 units capsule, Take 500 Units by mouth Every Other Day., Disp: , Rfl:     Fluticasone Furoate-Vilanterol (Breo Ellipta) 200-25 MCG/ACT inhaler, Inhale 1 puff Daily., Disp: 60 each, Rfl: 5    Magnesium Gluconate 550 MG tablet, Take 1 tablet by mouth Every Other Day., Disp: , Rfl:     MULTIPLE VITAMIN PO, Take 1 tablet by mouth Daily., Disp: , Rfl:     rosuvastatin (Crestor) 10 MG tablet, Take 1 tablet by mouth Daily., Disp: 90 tablet, Rfl: 3    saccharomyces boulardii (FLORASTOR) 250 MG capsule, Take 1 capsule by mouth 2 (Two) Times a Day., Disp: , Rfl:     VITAMIN A PO, Take 1 tablet by mouth Daily., Disp: , Rfl:     vitamin C (ASCORBIC ACID) 500 MG tablet, Take 2 tablets by mouth Daily., Disp: , Rfl:     VITAMIN E COMPLEX PO, Take 1 tablet by mouth Every Other Day., Disp: , Rfl:     Zinc 50 MG tablet, Take 1 tablet by mouth Every Other Day., Disp: , Rfl:     Review of Systems   Constitutional: Negative.    Respiratory: Negative.  Negative for chest tightness and shortness of breath.    Cardiovascular: Negative.  Negative for chest pain.   Gastrointestinal:  Negative for abdominal pain, blood in stool, constipation and diarrhea.        Vital Signs  Vitals:    08/28/24 0913   BP: (!) 134/102   BP Location: Left arm   Patient Position: Sitting   Cuff Size: Adult   Pulse: 73   Temp: 97.5 °F (36.4 °C)   TempSrc:  "Infrared   SpO2: 96%   Weight: 110 kg (243 lb 6.4 oz)   Height: 187.7 cm (73.9\")   PainSc: 0-No pain       Physical Exam  Vitals reviewed.   Constitutional:       Appearance: Normal appearance. He is well-developed.   HENT:      Head: Normocephalic and atraumatic.   Neck:      Thyroid: No thyromegaly.      Vascular: No carotid bruit.   Cardiovascular:      Rate and Rhythm: Normal rate and regular rhythm.      Heart sounds: Normal heart sounds. No murmur heard.     No friction rub. No gallop.   Pulmonary:      Effort: Pulmonary effort is normal.      Breath sounds: Normal breath sounds.   Musculoskeletal:      Cervical back: Neck supple.      Right lower leg: No edema.      Left lower leg: No edema.   Lymphadenopathy:      Cervical: No cervical adenopathy.   Skin:     General: Skin is warm and dry.      Comments: There is an inflamed seborrheic keratosis on the left shoulder.   Neurological:      Mental Status: He is alert and oriented to person, place, and time.      Cranial Nerves: No cranial nerve deficit.   Psychiatric:         Mood and Affect: Mood normal.         Behavior: Behavior normal.        Physical Exam      Cryotherapy, Skin Lesion    Date/Time: 8/28/2024 3:47 PM    Performed by: Ridge Hunter MD  Authorized by: Ridge Hunter MD  Patient tolerance: patient tolerated the procedure well with no immediate complications  Comments: Keratosis on left shoulder treated with cryotherapy.          ACE III MINI        Results             Assessment/Plan:    Diagnoses and all orders for this visit:    1. Type 2 diabetes mellitus with hyperglycemia, without long-term current use of insulin (Primary)  -     POC Glycosylated Hemoglobin (Hb A1C)    2. Attention deficit hyperactivity disorder, predominantly inattentive type    3. Mild intermittent asthma without complication      Assessment & Plan  1. Diabetes Mellitus.  A1c is pending. He has lost another 10 pounds, which he attributes to increased " walking at work. Continue healthy diet and exercise.    2. Attention Deficit Disorder.  He continues to have good focus and attention on the current dose of Adderall without evidence of tolerance or misuse. He has not yet picked up his Adderall prescription due to work constraints.    3. Asthma.  Asthma is currently asymptomatic. Continue Breo Ellipta inhaler and albuterol as needed.    4. Elevated Blood Pressure.  He reported a recent blood pressure reading of 150/92, which is high for him. He was advised to monitor his blood pressure at home using an automated arm cuff, ensuring he is in a resting state before taking measurements. He should avoid checking his blood pressure during stressful situations. He will send the readings over the next week or two.    Follow-up  The patient will follow up in 4 months.      Plan of care reviewed with patient at the conclusion of today's visit. Education was provided regarding diagnosis, management, and any prescribed or recommended OTC medications.Patient verbalizes understanding of and agreement with management plan.     Patient or patient representative verbalized consent for the use of Ambient Listening during the visit with  Ridge Hunter MD for chart documentation. 8/28/2024  15:47 EDT        Ridge Hunter MD

## 2025-01-17 DIAGNOSIS — F90.0 ATTENTION DEFICIT HYPERACTIVITY DISORDER, PREDOMINANTLY INATTENTIVE TYPE: ICD-10-CM

## 2025-01-17 RX ORDER — FLUTICASONE FUROATE AND VILANTEROL 200; 25 UG/1; UG/1
1 POWDER RESPIRATORY (INHALATION)
Qty: 60 EACH | Refills: 5 | Status: SHIPPED | OUTPATIENT
Start: 2025-01-17

## 2025-01-17 RX ORDER — ALBUTEROL SULFATE 90 UG/1
INHALANT RESPIRATORY (INHALATION)
Qty: 1 G | Refills: 5 | Status: SHIPPED | OUTPATIENT
Start: 2025-01-17

## 2025-01-17 RX ORDER — DEXTROAMPHETAMINE SACCHARATE, AMPHETAMINE ASPARTATE, DEXTROAMPHETAMINE SULFATE AND AMPHETAMINE SULFATE 2.5; 2.5; 2.5; 2.5 MG/1; MG/1; MG/1; MG/1
TABLET ORAL
Qty: 90 TABLET | Refills: 0 | Status: SHIPPED | OUTPATIENT
Start: 2025-01-17

## 2025-01-17 NOTE — TELEPHONE ENCOUNTER
"Caller: Cody Sanchez \"Joseluis\"    Relationship: Self    Best call back number: [phone]     Requested Prescriptions:   Requested Prescriptions     Pending Prescriptions Disp Refills    amphetamine-dextroamphetamine (Adderall) 10 MG tablet 90 tablet 0     Sig: Take 2 tablets in the morning, 1 tablet in the afternoon    albuterol sulfate  (90 Base) MCG/ACT inhaler 1 g 5     Sig: Inhale 2 puffs by inhalation route every 4-6 hours as needed    Fluticasone Furoate-Vilanterol (Breo Ellipta) 200-25 MCG/ACT inhaler 60 each 5     Sig: Inhale 1 puff Daily.        Pharmacy where request should be sent: [facility] [address] [place], [place] - [address] [place] [address] - [phone]  - [phone] FX     Last office visit with prescribing clinician: 8/28/2024   Last telemedicine visit with prescribing clinician: Visit date not found   Next office visit with prescribing clinician: Visit date not found     Additional details provided by patient:     Does the patient have less than a 3 day supply:  [x] Yes  [] No    Would you like a call back once the refill request has been completed: [] Yes [x] No    If the office needs to give you a call back, can they leave a voicemail: [] Yes [x] No    Luis Alberto Andrea Rep   01/17/25 15:20 EST   "

## 2025-01-24 ENCOUNTER — LAB (OUTPATIENT)
Dept: LAB | Facility: HOSPITAL | Age: 70
End: 2025-01-24
Payer: MEDICARE

## 2025-01-24 ENCOUNTER — OFFICE VISIT (OUTPATIENT)
Dept: INTERNAL MEDICINE | Facility: CLINIC | Age: 70
End: 2025-01-24
Payer: MEDICARE

## 2025-01-24 VITALS
HEART RATE: 82 BPM | SYSTOLIC BLOOD PRESSURE: 138 MMHG | DIASTOLIC BLOOD PRESSURE: 90 MMHG | BODY MASS INDEX: 33.14 KG/M2 | TEMPERATURE: 98.9 F | WEIGHT: 258.2 LBS | HEIGHT: 74 IN | OXYGEN SATURATION: 97 %

## 2025-01-24 DIAGNOSIS — Z12.11 COLON CANCER SCREENING: ICD-10-CM

## 2025-01-24 DIAGNOSIS — E11.65 TYPE 2 DIABETES MELLITUS WITH HYPERGLYCEMIA, WITHOUT LONG-TERM CURRENT USE OF INSULIN: ICD-10-CM

## 2025-01-24 DIAGNOSIS — E78.2 MIXED HYPERLIPIDEMIA: ICD-10-CM

## 2025-01-24 DIAGNOSIS — F90.0 ATTENTION DEFICIT HYPERACTIVITY DISORDER, PREDOMINANTLY INATTENTIVE TYPE: ICD-10-CM

## 2025-01-24 DIAGNOSIS — Z12.5 PROSTATE CANCER SCREENING: ICD-10-CM

## 2025-01-24 DIAGNOSIS — M1A.00X0 IDIOPATHIC CHRONIC GOUT WITHOUT TOPHUS, UNSPECIFIED SITE: ICD-10-CM

## 2025-01-24 DIAGNOSIS — J45.20 MILD INTERMITTENT ASTHMA WITHOUT COMPLICATION: ICD-10-CM

## 2025-01-24 DIAGNOSIS — Z00.00 MEDICARE ANNUAL WELLNESS VISIT, SUBSEQUENT: Primary | ICD-10-CM

## 2025-01-24 PROBLEM — J45.901 EXACERBATION OF ASTHMA: Status: RESOLVED | Noted: 2019-06-06 | Resolved: 2025-01-24

## 2025-01-24 LAB
ALBUMIN UR-MCNC: <1.2 MG/DL
CREAT UR-MCNC: 36.8 MG/DL
HBA1C MFR BLD: 5.8 % (ref 4.8–5.6)
MICROALBUMIN/CREAT UR: NORMAL MG/G{CREAT}

## 2025-01-24 PROCEDURE — 1160F RVW MEDS BY RX/DR IN RCRD: CPT | Performed by: INTERNAL MEDICINE

## 2025-01-24 PROCEDURE — 1159F MED LIST DOCD IN RCRD: CPT | Performed by: INTERNAL MEDICINE

## 2025-01-24 PROCEDURE — 99397 PER PM REEVAL EST PAT 65+ YR: CPT | Performed by: INTERNAL MEDICINE

## 2025-01-24 PROCEDURE — G0103 PSA SCREENING: HCPCS

## 2025-01-24 PROCEDURE — 36415 COLL VENOUS BLD VENIPUNCTURE: CPT

## 2025-01-24 PROCEDURE — 80053 COMPREHEN METABOLIC PANEL: CPT

## 2025-01-24 PROCEDURE — 84550 ASSAY OF BLOOD/URIC ACID: CPT

## 2025-01-24 PROCEDURE — 82043 UR ALBUMIN QUANTITATIVE: CPT

## 2025-01-24 PROCEDURE — 1126F AMNT PAIN NOTED NONE PRSNT: CPT | Performed by: INTERNAL MEDICINE

## 2025-01-24 PROCEDURE — 3044F HG A1C LEVEL LT 7.0%: CPT | Performed by: INTERNAL MEDICINE

## 2025-01-24 PROCEDURE — 82570 ASSAY OF URINE CREATININE: CPT

## 2025-01-24 PROCEDURE — 83036 HEMOGLOBIN GLYCOSYLATED A1C: CPT

## 2025-01-24 PROCEDURE — G0439 PPPS, SUBSEQ VISIT: HCPCS | Performed by: INTERNAL MEDICINE

## 2025-01-24 PROCEDURE — 80061 LIPID PANEL: CPT

## 2025-01-24 PROCEDURE — 82172 ASSAY OF APOLIPOPROTEIN: CPT
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
## 2025-01-25 LAB
ALBUMIN SERPL-MCNC: 4.4 G/DL (ref 3.5–5.2)
ALBUMIN/GLOB SERPL: 1.6 G/DL
ALP SERPL-CCNC: 93 U/L (ref 39–117)
ALT SERPL W P-5'-P-CCNC: 22 U/L (ref 1–41)
ANION GAP SERPL CALCULATED.3IONS-SCNC: 13.8 MMOL/L (ref 5–15)
AST SERPL-CCNC: 32 U/L (ref 1–40)
BILIRUB SERPL-MCNC: 0.5 MG/DL (ref 0–1.2)
BUN SERPL-MCNC: 15 MG/DL (ref 8–23)
BUN/CREAT SERPL: 13.4 (ref 7–25)
CALCIUM SPEC-SCNC: 9.3 MG/DL (ref 8.6–10.5)
CHLORIDE SERPL-SCNC: 103 MMOL/L (ref 98–107)
CHOLEST SERPL-MCNC: 193 MG/DL (ref 0–200)
CO2 SERPL-SCNC: 25.2 MMOL/L (ref 22–29)
CREAT SERPL-MCNC: 1.12 MG/DL (ref 0.76–1.27)
EGFRCR SERPLBLD CKD-EPI 2021: 71.1 ML/MIN/1.73
GLOBULIN UR ELPH-MCNC: 2.8 GM/DL
GLUCOSE SERPL-MCNC: 88 MG/DL (ref 65–99)
HDLC SERPL-MCNC: 40 MG/DL (ref 40–60)
LDLC SERPL CALC-MCNC: 132 MG/DL (ref 0–100)
LDLC/HDLC SERPL: 3.24 {RATIO}
POTASSIUM SERPL-SCNC: 4 MMOL/L (ref 3.5–5.2)
PROT SERPL-MCNC: 7.2 G/DL (ref 6–8.5)
PSA SERPL-MCNC: 0.26 NG/ML (ref 0–4)
SODIUM SERPL-SCNC: 142 MMOL/L (ref 136–145)
TRIGL SERPL-MCNC: 118 MG/DL (ref 0–150)
URATE SERPL-MCNC: 5.7 MG/DL (ref 3.4–7)
VLDLC SERPL-MCNC: 21 MG/DL (ref 5–40)

## 2025-01-28 LAB — APO B SERPL-MCNC: 107 MG/DL

## 2025-01-31 RX ORDER — ROSUVASTATIN CALCIUM 10 MG/1
10 TABLET, COATED ORAL DAILY
Qty: 90 TABLET | Refills: 3 | Status: SHIPPED | OUTPATIENT
Start: 2025-01-31

## 2025-02-14 RX ORDER — SODIUM, POTASSIUM,MAG SULFATES 17.5-3.13G
1 SOLUTION, RECONSTITUTED, ORAL ORAL TAKE AS DIRECTED
Qty: 354 ML | Refills: 0 | Status: SHIPPED | OUTPATIENT
Start: 2025-02-14

## 2025-02-20 ENCOUNTER — TELEPHONE (OUTPATIENT)
Dept: INTERNAL MEDICINE | Facility: CLINIC | Age: 70
End: 2025-02-20
Payer: MEDICARE

## 2025-02-20 NOTE — TELEPHONE ENCOUNTER
"Caller: Cody Sanchez \"Joseluis\"    Relationship: Self    Best call back number: [phone]     What is the medical concern/diagnosis: RED SPLOTCHES THAT WILL NOT GO AWAY AND SEEM TO BE SPREADING, AND RASH ON NECK     What specialty or service is being requested: DERMATOLOGY    What is the provider, practice or medical service name: WHOEVER PCP RECOMMENDS    What is the office location: [place] OR [place]     Any additional details: AS SOON AS POSSIBLE        "

## 2025-02-21 ENCOUNTER — TELEPHONE (OUTPATIENT)
Dept: INTERNAL MEDICINE | Facility: CLINIC | Age: 70
End: 2025-02-21
Payer: MEDICARE

## 2025-02-21 ENCOUNTER — OFFICE VISIT (OUTPATIENT)
Dept: INTERNAL MEDICINE | Facility: CLINIC | Age: 70
End: 2025-02-21
Payer: MEDICARE

## 2025-02-21 VITALS
TEMPERATURE: 98.7 F | DIASTOLIC BLOOD PRESSURE: 80 MMHG | HEART RATE: 70 BPM | SYSTOLIC BLOOD PRESSURE: 150 MMHG | HEIGHT: 74 IN | WEIGHT: 259 LBS | OXYGEN SATURATION: 98 % | BODY MASS INDEX: 33.24 KG/M2

## 2025-02-21 DIAGNOSIS — L30.9 DERMATITIS: ICD-10-CM
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
## 2025-02-21 RX ORDER — AZITHROMYCIN 250 MG/1
TABLET, FILM COATED ORAL
Qty: 6 TABLET | Refills: 0 | Status: SHIPPED | OUTPATIENT
Start: 2025-02-21

## 2025-02-21 RX ORDER — TRIAMCINOLONE ACETONIDE 1 MG/G
1 OINTMENT TOPICAL 2 TIMES DAILY
Qty: 14 G | Refills: 0 | Status: SHIPPED | OUTPATIENT
Start: 2025-02-21 | End: 2025-02-28

## 2025-02-21 NOTE — LETTER
[facility]  Vaccine Consent Form    Patient Name:  Cody Sanchez  Patient :  1955     Vaccine(s) Ordered    Tdap Vaccine => 6yo IM (BOOSTRIX/ADACEL)        Screening Checklist  The following questions should be completed prior to vaccination. If you answer “yes” to any question, it does not necessarily mean you should not be vaccinated. It just means we may need to clarify or ask more questions. If a question is unclear, please ask your healthcare provider to explain it.    Yes No   Any fever or moderate to severe illness today (mild illness and/or antibiotic treatment are not contraindications)?     Do you have a history of a serious reaction to any previous vaccinations, such as anaphylaxis, encephalopathy within 7 days, Guillain-[place] syndrome within 6 weeks, seizure?     Have you received any live vaccine(s) (e.g MMR, TORO) or any other vaccines in the last month (to ensure duplicate doses aren't given)?     Do you have an anaphylactic allergy to latex (DTaP, DTaP-IPV, Hep A, Hep B, MenB, RV, Td, Tdap), baker’s yeast (Hep B, HPV), polysorbates (RSV, nirsevimab, PCV 20, Rotavirrus, Tdap, Shingrix), or gelatin (TORO, MMR)?     Do you have an anaphylactic allergy to neomycin (Rabies, TORO, MMR, IPV, Hep A), polymyxin B (IPV), or streptomycin (IPV)?      Any cancer, leukemia, AIDS, or other immune system disorder? (TORO, MMR, RV)     Do you have a parent, brother, or sister with an immune system problem (if immune competence of vaccine recipient clinically verified, can proceed)? (MMR, TORO)     Any recent steroid treatments for >2 weeks, chemotherapy, or radiation treatment? (TORO, MMR)     Have you received antibody-containing blood transfusions or IVIG in the past 11 months (recommended interval is dependent on product)? (MMR, TORO)     Have you taken antiviral drugs (acyclovir, famciclovir, valacyclovir for TORO) in the last 24 or 48 hours, respectively?      Are you pregnant or planning to become  "pregnant within 1 month? (TORO, MMR, HPV, IPV, MenB, Abrexvy; For Hep B- refer to Engerix-B; For RSV - Abrysvo is indicated for 32-36 weeks of pregnancy from September to January)     For infants, have you ever been told your child has had intussusception or a medical emergency involving obstruction of the intestine (Rotavirus)? If not for an infant, can skip this question.         *Ordering Physicians/APC should be consulted if \"yes\" is checked by the patient or guardian above.  I have received, read, and understand the Vaccine Information Statement (VIS) for each vaccine ordered.  I have considered my or my child's health status as well as the health status of my close contacts.  I have taken the opportunity to discuss my vaccine questions with my or my child's health care provider.   I have requested that the ordered vaccine(s) be given to me or my child.  I understand the benefits and risks of the vaccines.  I understand that I should remain in the clinic for 15 minutes after receiving the vaccine(s).  _________________________________________________________  Signature of Patient or Parent/Legal Guardian ____________________  Date     "

## 2025-02-21 NOTE — TELEPHONE ENCOUNTER
"    Caller: Cody Sanchez \"Joseluis\"    Relationship: Self    Best call back number: [phone]    Which medication are you concerned about: MEDICATION PRESCRIBED AT APPOINTMENT TODAY     Who prescribed you this medication: DOCTOR ORTEGA      What are your concerns: THE PATIENT RECEIVED BOTH AZTHROMYCIN AND AMOX CLAV HE WANTS TO KNOW IF HE SHOULD BE TAKING BOTH MEDICATIONS      "

## 2025-02-21 NOTE — PROGRESS NOTES
Office Note     Name: Cody Sanchez    : 1955     MRN: [id number]     Chief Complaint  Rash (Neck; LLE)    Subjective     History of Present Illness:  Cody Sanchez is a 69 y.o. male who presents today for a rash.    History of Present Illness  The patient presents for evaluation of a rash.    Approximately 6 months ago, he was scratched by a cat, which has since developed into a rash. Initially, he believed the rash would resolve on its own, but it has persisted and even spread. The rash began as a small lesion on his right ankle and has now expanded to cover his entire leg. He also reports similar lesions on his left arm, which he attributes to frequent scratching by a female cat. These lesions have not shown significant healing. Additionally, he mentions a new rash under his chin. He has been applying lotion to the affected areas to prevent dryness and subsequent scratching, but this intervention has not been effective.        Review of Systems:   Review of Systems   Constitutional:  Negative for chills and fever.   Skin:  Positive for rash.       Past Medical History:   Past Medical History:   Diagnosis Date    ADHD (attention deficit hyperactivity disorder)     taking adderall per MD    Allergic     [place], started childhood    Asthma     sometimes during allergy seasons    Cataract recent    being monitored    Deep vein thrombosis     HL (hearing loss) In loud situations       Past Surgical History:   Past Surgical History:   Procedure Laterality Date    COLONOSCOPY      done 15yrs or so ago?    TONSILLECTOMY         Family History:   Family History   Problem Relation Age of Onset    Hypertension Mother     Coronary artery disease Mother     Hypertension Father     Coronary artery disease Father     Cancer Brother         PROSTATE CANCER        Social History:   Social History     Socioeconomic History    Marital status:    Tobacco Use    Smoking status: Never    Smokeless  tobacco: Never   Vaping Use    Vaping status: Never Used   Substance and Sexual Activity    Alcohol use: No    Drug use: No    Sexual activity: Yes     Partners: Female       Immunizations:   Immunization History   Administered Date(s) Administered    COVID-19 (PFIZER) BIVALENT 12+YRS 10/28/2022    COVID-19 (PFIZER) Purple Cap Monovalent 04/02/2021, 05/13/2021    Fluad Quad 65+ 12/17/2020    Fluzone High-Dose 65+yrs 10/05/2021, 10/28/2022, 11/07/2023    Pneumococcal Conjugate 20-Valent (PCV20) 11/07/2023    Tdap 04/10/2009        Medications:     Current Outpatient Medications:     albuterol sulfate  (90 Base) MCG/ACT inhaler, Inhale 2 puffs by inhalation route every 4-6 hours as needed, Disp: 1 g, Rfl: 5    amphetamine-dextroamphetamine (Adderall) 10 MG tablet, Take 2 tablets in the morning, 1 tablet in the afternoon, Disp: 90 tablet, Rfl: 0    B Complex Vitamins (VITAMIN-B COMPLEX PO), Take 1 tablet by mouth Daily., Disp: , Rfl:     BIOTIN PO, Take 1 tablet by mouth Every Other Day., Disp: , Rfl:     Cholecalciferol (VITAMIN D3) 1000 units capsule, Take 500 Units by mouth Every Other Day., Disp: , Rfl:     Fluticasone Furoate-Vilanterol (Breo Ellipta) 200-25 MCG/ACT inhaler, Inhale 1 puff Daily., Disp: 60 each, Rfl: 5    Magnesium Gluconate 550 MG tablet, Take 1 tablet by mouth Every Other Day., Disp: , Rfl:     MULTIPLE VITAMIN PO, Take 1 tablet by mouth Daily., Disp: , Rfl:     rosuvastatin (Crestor) 10 MG tablet, Take 1 tablet by mouth Daily., Disp: 90 tablet, Rfl: 3    sodium-potassium-magnesium sulfates (Suprep Bowel Prep Kit) 17.5-3.13-1.6 GM/177ML solution oral solution, Take 1 bottle by mouth Take As Directed., Disp: 354 mL, Rfl: 0    VITAMIN A PO, Take 1 tablet by mouth Daily., Disp: , Rfl:     vitamin C (ASCORBIC ACID) 500 MG tablet, Take 2 tablets by mouth Daily., Disp: , Rfl:     VITAMIN E COMPLEX PO, Take 1 tablet by mouth Every Other Day., Disp: , Rfl:     Zinc 50 MG tablet, Take 1 tablet by  "mouth Every Other Day., Disp: , Rfl:     azithromycin (Zithromax Z-Paco) 250 MG tablet, Take 2 tablets by mouth on day 1, then 1 tablet daily on days 2-5, Disp: 6 tablet, Rfl: 0    triamcinolone (KENALOG) 0.1 % ointment, Apply 1 Application topically to the appropriate area as directed 2 (Two) Times a Day for 7 days., Disp: 14 g, Rfl: 0    Allergies:   No Known Allergies    Objective     Vital Signs  /80 (BP Location: Left arm, Patient Position: Sitting, Cuff Size: Adult)   Pulse 70   Temp 98.7 °F (37.1 °C) (Infrared)   Ht 187.7 cm (73.9\")   Wt 117 kg (259 lb)   SpO2 98%   BMI 33.35 kg/m²   Body mass index is 33.35 kg/m².            Physical Exam  Constitutional:       Appearance: Normal appearance.   HENT:      Head: Normocephalic and atraumatic.   Eyes:      Extraocular Movements: Extraocular movements intact.      Conjunctiva/sclera: Conjunctivae normal.   Pulmonary:      Effort: Pulmonary effort is normal. No respiratory distress.   Skin:     Findings: Rash present.      Comments: Please see media images below.        Neurological:      General: No focal deficit present.      Mental Status: He is alert and oriented to person, place, and time.   Psychiatric:         Mood and Affect: Mood normal.         Behavior: Behavior normal.                Medial left ankle        Left leg        Under Chin    Assessment and Plan     Assessment/Plan:  Diagnoses and all orders for this visit:    1. Cat scratch (Primary)  -Patient has multiple cat scratches with surrounding erythema and overlying scale.  I believe he may have an associated cellulitis from these scratches.  I will prescribe a Z-Paco for treatment as this provides coverage of possible Bartonella infection.  We will also give Tdap vaccine today.  -     Tdap Vaccine => 6yo IM (BOOSTRIX/ADACEL)  -     azithromycin (Zithromax Z-Paco) 250 MG tablet; Take 2 tablets by mouth on day 1, then 1 tablet daily on days 2-5  Dispense: 6 tablet; Refill: 0    2. " Dermatitis  -Rash located on bilateral neck consistent with atopic dermatitis.  Will trial course of treatment with triamcinolone ointment twice daily for 7 days.  A plan to follow-up in about 1 week to monitor improvement of both his neck rash and cat scratches.  -     triamcinolone (KENALOG) 0.1 % ointment; Apply 1 Application topically to the appropriate area as directed 2 (Two) Times a Day for 7 days.  Dispense: 14 g; Refill: 0      Follow Up  Return in about 1 week (around 2/28/2025).    Patient or patient representative verbalized consent for the use of Ambient Listening during the visit with  Liat Scott MD for chart documentation. 2/21/2025  13:23 EST      Liat Scott MD   [facility] [place] [address]

## 2025-02-27 RX ORDER — POLYETHYLENE GLYCOL 3350, SODIUM SULFATE, POTASSIUM CHLORIDE, MAGNESIUM SULFATE, AND SODIUM CHLORIDE FOR ORAL SOLUTION 178.7-7.3G
1 KIT ORAL TAKE AS DIRECTED
Qty: 1 EACH | Refills: 0 | Status: SHIPPED | OUTPATIENT
Start: 2025-02-27

## 2025-02-28 ENCOUNTER — OFFICE VISIT (OUTPATIENT)
Dept: INTERNAL MEDICINE | Facility: CLINIC | Age: 70
End: 2025-02-28
Payer: MEDICARE

## 2025-02-28 VITALS
SYSTOLIC BLOOD PRESSURE: 134 MMHG | HEIGHT: 74 IN | BODY MASS INDEX: 32.65 KG/M2 | TEMPERATURE: 98.4 F | WEIGHT: 254.4 LBS | DIASTOLIC BLOOD PRESSURE: 84 MMHG | HEART RATE: 68 BPM

## 2025-02-28 DIAGNOSIS — L98.9 SKIN LESION: Primary | ICD-10-CM

## 2025-02-28 PROCEDURE — 1126F AMNT PAIN NOTED NONE PRSNT: CPT | Performed by: STUDENT IN AN ORGANIZED HEALTH CARE EDUCATION/TRAINING PROGRAM

## 2025-02-28 PROCEDURE — 3044F HG A1C LEVEL LT 7.0%: CPT | Performed by: STUDENT IN AN ORGANIZED HEALTH CARE EDUCATION/TRAINING PROGRAM

## 2025-02-28 PROCEDURE — 99214 OFFICE O/P EST MOD 30 MIN: CPT | Performed by: STUDENT IN AN ORGANIZED HEALTH CARE EDUCATION/TRAINING PROGRAM

## 2025-02-28 NOTE — PROGRESS NOTES
Office Note     Name: Cody Sanchez    : 1955     MRN: [id number]     Chief Complaint  Rash follow up    Subjective     History of Present Illness:  Cody Sanchez is a 69 y.o. male who presents today for reevaluation of skin lesions.    The rash located on his neck has since resolved with treatment of topical steroids.  However the skin rash is located at the areas of previous cat scratches have not significantly improved after treatment with azithromycin.  He has also noticed new areas as well.    These lesions are itchy and very sensitive.  He denies any fevers or chills.    Review of Systems:   Review of Systems   Constitutional:  Negative for chills and fever.       Past Medical History:   Past Medical History:   Diagnosis Date   • ADHD (attention deficit hyperactivity disorder)     taking adderall per MD   • Allergic     [place] resident, started childhood   • Asthma     sometimes during allergy seasons   • Cataract recent    being monitored   • Deep vein thrombosis    • HL (hearing loss) In loud situations       Past Surgical History:   Past Surgical History:   Procedure Laterality Date   • COLONOSCOPY      done 15yrs or so ago?   • TONSILLECTOMY         Family History:   Family History   Problem Relation Age of Onset   • Hypertension Mother    • Coronary artery disease Mother    • Hypertension Father    • Coronary artery disease Father    • Cancer Brother         PROSTATE CANCER        Social History:   Social History     Socioeconomic History   • Marital status:    Tobacco Use   • Smoking status: Never   • Smokeless tobacco: Never   Vaping Use   • Vaping status: Never Used   Substance and Sexual Activity   • Alcohol use: No   • Drug use: No   • Sexual activity: Yes     Partners: Female       Immunizations:   Immunization History   Administered Date(s) Administered   • COVID-19 (PFIZER) BIVALENT 12+YRS 10/28/2022   • COVID-19 (PFIZER) Purple Cap Monovalent 2021, 2021   •  Fluad Quad 65+ 12/17/2020   • Fluzone High-Dose 65+yrs 10/05/2021, 10/28/2022, 11/07/2023   • Pneumococcal Conjugate 20-Valent (PCV20) 11/07/2023   • Tdap 04/10/2009, 02/21/2025        Medications:     Current Outpatient Medications:   •  albuterol sulfate  (90 Base) MCG/ACT inhaler, Inhale 2 puffs by inhalation route every 4-6 hours as needed, Disp: 1 g, Rfl: 5  •  amphetamine-dextroamphetamine (Adderall) 10 MG tablet, Take 2 tablets in the morning, 1 tablet in the afternoon, Disp: 90 tablet, Rfl: 0  •  B Complex Vitamins (VITAMIN-B COMPLEX PO), Take 1 tablet by mouth Daily., Disp: , Rfl:   •  BIOTIN PO, Take 1 tablet by mouth Every Other Day., Disp: , Rfl:   •  Cholecalciferol (VITAMIN D3) 1000 units capsule, Take 500 Units by mouth Every Other Day., Disp: , Rfl:   •  Fluticasone Furoate-Vilanterol (Breo Ellipta) 200-25 MCG/ACT inhaler, Inhale 1 puff Daily., Disp: 60 each, Rfl: 5  •  Magnesium Gluconate 550 MG tablet, Take 1 tablet by mouth Every Other Day., Disp: , Rfl:   •  MULTIPLE VITAMIN PO, Take 1 tablet by mouth Daily., Disp: , Rfl:   •  PEG 3350-KCl-NaCl-NaSulf-MgSul (Suflave) 178.7 g reconstituted solution, Take 1 kit by mouth Take As Directed., Disp: 1 each, Rfl: 0  •  rosuvastatin (Crestor) 10 MG tablet, Take 1 tablet by mouth Daily., Disp: 90 tablet, Rfl: 3  •  triamcinolone (KENALOG) 0.1 % ointment, Apply 1 Application topically to the appropriate area as directed 2 (Two) Times a Day for 7 days., Disp: 14 g, Rfl: 0  •  VITAMIN A PO, Take 1 tablet by mouth Daily., Disp: , Rfl:   •  vitamin C (ASCORBIC ACID) 500 MG tablet, Take 2 tablets by mouth Daily., Disp: , Rfl:   •  VITAMIN E COMPLEX PO, Take 1 tablet by mouth Every Other Day., Disp: , Rfl:   •  Zinc 50 MG tablet, Take 1 tablet by mouth Every Other Day., Disp: , Rfl:     Allergies:   No Known Allergies    Objective     Vital Signs  /84 (BP Location: Left arm, Patient Position: Sitting, Cuff Size: Large Adult)   Pulse 68   Temp 98.4  "°F (36.9 °C) (Temporal)   Ht 187.7 cm (73.9\")   Wt 115 kg (254 lb 6.4 oz)   BMI 32.75 kg/m²   Body mass index is 32.75 kg/m².            Physical Exam  Constitutional:       Appearance: Normal appearance.   HENT:      Head: Normocephalic and atraumatic.   Eyes:      Extraocular Movements: Extraocular movements intact.      Conjunctiva/sclera: Conjunctivae normal.   Skin:     Findings: Lesion and rash present.      Comments: Erythematous base with overlying scale, pruritic   Neurological:      General: No focal deficit present.      Mental Status: He is alert and oriented to person, place, and time.   Psychiatric:         Mood and Affect: Mood normal.         Behavior: Behavior normal.          (From previous visit, no significant change to area today)    Assessment and Plan     Assessment/Plan:  Diagnoses and all orders for this visit:    1. Skin lesion (Primary)  -I previously believed lesions may be secondary to infection from cat scratches and provided treatment with azithromycin to cover for possible Bartonella infection.  Since he is not having any improvement and new skin lesions have appeared I am now wondering if this may be a flare of psoriasis which has worsened at areas of injury consistent with the Koebner phenomenon.  -I have asked him to apply twice daily triamcinolone ointment to one of his skin lesions to assess for improvement.  If lesions are secondary to psoriasis I would expect improvement with topical steroid.  If this provides benefit he may apply topical steroid to all areas for treatment.  I will also refer to dermatology for their evaluation.  -     Ambulatory Referral to Dermatology      Follow Up  No follow-ups on file.      Liat Scott MD   [facility] [address]  "

## 2025-03-10 ENCOUNTER — TELEPHONE (OUTPATIENT)
Dept: GASTROENTEROLOGY | Facility: CLINIC | Age: 70
End: 2025-03-10

## 2025-03-10 NOTE — TELEPHONE ENCOUNTER
Caller: THANH BONILLA    Relationship to patient: SELF    Best call back number: [phone]    Chief complaint: R/S OR POSSIBLY CX COLONOSCOPY     Type of visit: COLONOSCOPY     Requested date:     If rescheduling, when is the original appointment: 3.13     Additional notes:

## 2025-06-03 ENCOUNTER — OFFICE VISIT (OUTPATIENT)
Dept: INTERNAL MEDICINE | Facility: CLINIC | Age: 70
End: 2025-06-03
Payer: MEDICARE

## 2025-06-03 VITALS
DIASTOLIC BLOOD PRESSURE: 84 MMHG | TEMPERATURE: 98.7 F | HEIGHT: 74 IN | OXYGEN SATURATION: 96 % | SYSTOLIC BLOOD PRESSURE: 136 MMHG | BODY MASS INDEX: 33.55 KG/M2 | WEIGHT: 261.4 LBS | HEART RATE: 70 BPM

## 2025-06-03 DIAGNOSIS — J45.20 MILD INTERMITTENT ASTHMA WITHOUT COMPLICATION: ICD-10-CM

## 2025-06-03 DIAGNOSIS — E11.65 TYPE 2 DIABETES MELLITUS WITH HYPERGLYCEMIA, WITHOUT LONG-TERM CURRENT USE OF INSULIN: Primary | ICD-10-CM

## 2025-06-03 DIAGNOSIS — F90.0 ATTENTION DEFICIT HYPERACTIVITY DISORDER, PREDOMINANTLY INATTENTIVE TYPE: ICD-10-CM

## 2025-06-03 LAB
EXPIRATION DATE: ABNORMAL
HBA1C MFR BLD: 5.8 % (ref 4.5–5.7)
Lab: ABNORMAL

## 2025-06-03 RX ORDER — DEXTROAMPHETAMINE SACCHARATE, AMPHETAMINE ASPARTATE, DEXTROAMPHETAMINE SULFATE AND AMPHETAMINE SULFATE 2.5; 2.5; 2.5; 2.5 MG/1; MG/1; MG/1; MG/1
TABLET ORAL
Qty: 90 TABLET | Refills: 0 | Status: SHIPPED | OUTPATIENT
Start: 2025-06-03

## 2025-06-03 NOTE — PROGRESS NOTES
[facility]     Cody Sanchez  1955   [id number]      Patient Care Team:  Ridge Hunter MD as PCP - General  Ridge Hunter MD as PCP - Family Medicine    Chief Complaint::   Chief Complaint   Patient presents with    ADHD    Hyperlipidemia    Diabetes        HPI  History of Present Illness  The patient presents for a follow-up of diabetes, attention deficit disorder, and asthma.    He reports experiencing weight gain. His A1c level was previously reported to be between 5.7 and 5.9. Earlier today, he had a consultation with Dr. Pike, an ophthalmologist, who recommended cataract surgery on his left eye, although the procedure has not yet been scheduled.    He reports overall good health and satisfactory sleep patterns. He has been managing his attention deficit disorder with a daily dose of Adderall, which he believes was previously interfering with his sleep. To mitigate this, he has adjusted his medication schedule to take the first dose earlier in the day and the second dose, if needed, shortly after lunch. This change in routine appears to have improved his sleep quality.    His asthma is currently well-controlled. However, he experienced a recent episode of nasal congestion, requiring him to blow his nose multiple times before leaving his residence. He is uncertain about the availability of refills for his Breo inhaler.      Chronic Conditions:      Patient Active Problem List   Diagnosis    Gout, unspecified    Attention deficit hyperactivity disorder, predominantly inattentive type    Obesity    Allergic rhinitis    Mixed hyperlipidemia    Type 2 diabetes mellitus with hyperglycemia, without long-term current use of insulin    Mild intermittent asthma without complication        Past Medical History:   Diagnosis Date    ADHD (attention deficit hyperactivity disorder)     taking adderall per MD    Allergic     [place] resident, started childhood    Asthma     sometimes during  allergy seasons    Cataract recent    being monitored    Deep vein thrombosis     HL (hearing loss) In loud situations       Past Surgical History:   Procedure Laterality Date    COLONOSCOPY      done 15yrs or so ago?    TONSILLECTOMY         Family History   Problem Relation Age of Onset    Hypertension Mother     Coronary artery disease Mother     Hypertension Father     Coronary artery disease Father     Cancer Brother         PROSTATE CANCER        Social History     Socioeconomic History    Marital status:    Tobacco Use    Smoking status: Never    Smokeless tobacco: Never   Vaping Use    Vaping status: Never Used   Substance and Sexual Activity    Alcohol use: No    Drug use: No    Sexual activity: Yes     Partners: Female       No Known Allergies      Current Outpatient Medications:     albuterol sulfate  (90 Base) MCG/ACT inhaler, Inhale 2 puffs by inhalation route every 4-6 hours as needed, Disp: 1 g, Rfl: 5    amphetamine-dextroamphetamine (Adderall) 10 MG tablet, Take 2 tablets in the morning, 1 tablet in the afternoon, Disp: 90 tablet, Rfl: 0    B Complex Vitamins (VITAMIN-B COMPLEX PO), Take 1 tablet by mouth Daily., Disp: , Rfl:     BIOTIN PO, Take 1 tablet by mouth Every Other Day., Disp: , Rfl:     Cholecalciferol (VITAMIN D3) 1000 units capsule, Take 500 Units by mouth Every Other Day., Disp: , Rfl:     Fluticasone Furoate-Vilanterol (Breo Ellipta) 200-25 MCG/ACT inhaler, Inhale 1 puff Daily., Disp: 60 each, Rfl: 5    Magnesium Gluconate 550 MG tablet, Take 1 tablet by mouth Every Other Day., Disp: , Rfl:     MULTIPLE VITAMIN PO, Take 1 tablet by mouth Daily., Disp: , Rfl:     PEG 3350-KCl-NaCl-NaSulf-MgSul (Suflave) 178.7 g reconstituted solution, Take 1 kit by mouth Take As Directed., Disp: 1 each, Rfl: 0    rosuvastatin (Crestor) 10 MG tablet, Take 1 tablet by mouth Daily., Disp: 90 tablet, Rfl: 3    VITAMIN A PO, Take 1 tablet by mouth Daily., Disp: , Rfl:     vitamin C (ASCORBIC  "ACID) 500 MG tablet, Take 2 tablets by mouth Daily., Disp: , Rfl:     VITAMIN E COMPLEX PO, Take 1 tablet by mouth Every Other Day., Disp: , Rfl:     Zinc 50 MG tablet, Take 1 tablet by mouth Every Other Day., Disp: , Rfl:     Review of Systems   Constitutional: Negative.    Respiratory: Negative.  Negative for chest tightness and shortness of breath.    Cardiovascular: Negative.  Negative for chest pain.   Gastrointestinal:  Negative for abdominal pain, blood in stool, constipation and diarrhea.        Vital Signs  Vitals:    06/03/25 1128   BP: 136/84   BP Location: Left arm   Patient Position: Sitting   Cuff Size: Adult   Pulse: 70   Temp: 98.7 °F (37.1 °C)   TempSrc: Infrared   SpO2: 96%   Weight: 119 kg (261 lb 6.4 oz)   Height: 187.7 cm (73.9\")   PainSc: 0-No pain       Physical Exam  Vitals reviewed.   Constitutional:       Appearance: Normal appearance. He is well-developed.   HENT:      Head: Normocephalic and atraumatic.   Neck:      Thyroid: No thyromegaly.      Vascular: No carotid bruit.   Cardiovascular:      Rate and Rhythm: Normal rate and regular rhythm.      Heart sounds: Normal heart sounds. No murmur heard.     No friction rub. No gallop.   Pulmonary:      Effort: Pulmonary effort is normal.      Breath sounds: Normal breath sounds.   Musculoskeletal:      Cervical back: Normal range of motion and neck supple.      Right lower leg: No edema.      Left lower leg: No edema.   Lymphadenopathy:      Cervical: No cervical adenopathy.   Skin:     General: Skin is warm and dry.   Neurological:      Mental Status: He is alert and oriented to person, place, and time.      Cranial Nerves: No cranial nerve deficit.   Psychiatric:         Mood and Affect: Mood normal.         Behavior: Behavior normal.        Physical Exam  Neurological: Awake, alert, oriented x4, no focal deficit  Head: Normocephalic, atraumatic  Ears: External ear canals and tympanic membranes intact  Neck: Supple, no " abnormalities  Respiratory: Clear to auscultation, no wheezing, rales or rhonchi  Cardiovascular: Regular rate and rhythm, no murmurs, rubs, or gallops  Extremities: No edema, no cyanosis  Musculoskeletal: No joint or muscular abnormalities noted  Skin: No abnormalities, no rashes or lesions    Procedures    ACE III MINI        Results  Labs   - A1c: 5.8%           Assessment/Plan:    Diagnoses and all orders for this visit:    1. Type 2 diabetes mellitus with hyperglycemia, without long-term current use of insulin (Primary)  -     POC Glycosylated Hemoglobin (Hb A1C)    2. Attention deficit hyperactivity disorder, predominantly inattentive type  -     amphetamine-dextroamphetamine (Adderall) 10 MG tablet; Take 2 tablets in the morning, 1 tablet in the afternoon  Dispense: 90 tablet; Refill: 0    3. Mild intermittent asthma without complication      Assessment & Plan  1. Diabetes Mellitus.  - A1c level is currently at 5.8, which is within the acceptable range for individuals with diabetes.  - Regular monitoring of blood sugar levels and maintaining a balanced diet are advised.  - Regular exercise is recommended to help control weight and blood sugar levels.  - Discussion about the importance of eye exams due to the risk of diabetic retinopathy.    2. Attention Deficit Disorder.  - Reduced Adderall dosage to one a day to improve sleep.  - Advised to take Adderall earlier in the day to avoid sleep disturbances.  - Prescription for Adderall will be renewed.  - Adjusted sleep schedule has improved sleep quality.    3. Asthma.  - Asthma is well-controlled with Breo.  - Advised to continue using Breo as prescribed.  - Reminded to check if there is a refill left and to contact the office if a new prescription is needed.  - Noted increased nasal congestion, possibly due to seasonal allergies.      Plan of care reviewed with patient at the conclusion of today's visit. Education was provided regarding diagnosis, management,  and any prescribed or recommended OTC medications.Patient verbalizes understanding of and agreement with management plan.     Patient or patient representative verbalized consent for the use of Ambient Listening during the visit with  Ridge Hnuter MD for chart documentation. 6/3/2025  15:33 EDT        Ridge Hunter MD

## 2025-06-04 RX ORDER — FLUTICASONE FUROATE AND VILANTEROL TRIFENATATE 100; 25 UG/1; UG/1
1 POWDER RESPIRATORY (INHALATION) DAILY
Qty: 60 EACH | Refills: 0 | OUTPATIENT
Start: 2025-06-04

## 2025-06-04 NOTE — TELEPHONE ENCOUNTER
Rx Refill Note  Requested Prescriptions     Pending Prescriptions Disp Refills    Breo Ellipta 100-25 MCG/ACT aerosol powder  [Pharmacy Med Name: Breo Ellipta Inhalation Aerosol Powder Breath Activated 100-25 MCG/ACT] 60 each 0     Sig: INHALE 1 PUFF BY MOUTH EVERY DAY      Last office visit with prescribing clinician: 6/3/2025   Last telemedicine visit with prescribing clinician: Visit date not found   Next office visit with prescribing clinician: 9/3/2025                         Would you like a call back once the refill request has been completed: [] Yes [] No    If the office needs to give you a call back, can they leave a voicemail: [] Yes [] No    Emelyn Roberts MA  06/04/25, 12:48 EDT